# Patient Record
Sex: MALE | Race: WHITE
[De-identification: names, ages, dates, MRNs, and addresses within clinical notes are randomized per-mention and may not be internally consistent; named-entity substitution may affect disease eponyms.]

---

## 2017-05-04 ENCOUNTER — HOSPITAL ENCOUNTER (EMERGENCY)
Age: 57
Discharge: HOME | End: 2017-05-04
Payer: COMMERCIAL
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
## 2017-05-04 DIAGNOSIS — S22.32XA: Primary | ICD-10-CM

## 2017-05-06 ENCOUNTER — HOSPITAL ENCOUNTER (OUTPATIENT)
Age: 57
Discharge: HOME | End: 2017-05-06
Payer: COMMERCIAL

## 2017-05-06 ENCOUNTER — HOSPITAL ENCOUNTER (EMERGENCY)
Dept: HOSPITAL 76 - ED | Age: 57
Discharge: HOME | End: 2017-05-06
Payer: COMMERCIAL

## 2017-05-06 ENCOUNTER — HOSPITAL ENCOUNTER (OUTPATIENT)
Age: 57
Discharge: ANOTHER HOSPITAL | End: 2017-05-06
Payer: COMMERCIAL

## 2017-05-06 VITALS — DIASTOLIC BLOOD PRESSURE: 86 MMHG | SYSTOLIC BLOOD PRESSURE: 174 MMHG

## 2017-05-06 DIAGNOSIS — F41.9: Primary | ICD-10-CM

## 2017-05-06 DIAGNOSIS — F41.0: Primary | ICD-10-CM

## 2017-05-06 DIAGNOSIS — F10.239: Primary | ICD-10-CM

## 2017-05-06 DIAGNOSIS — F17.200: ICD-10-CM

## 2017-05-06 PROCEDURE — 99283 EMERGENCY DEPT VISIT LOW MDM: CPT

## 2017-05-06 NOTE — ED PHYSICIAN DOCUMENTATION
History of Present Illness





- Stated complaint


Stated Complaint: ANXIETY





- Chief complaint


Chief Complaint: MHE





- History obtained from


History obtained from: Patient, EMS





- History of Present Illness


Timing: Today





- Additonal information


Additional information: 





55 y/o male fell about one week ago and broke a rib. He was drinking at the 

time and he has since stopped. He is one week out from his last drink. He took 

some left over vicoden for the pain and he developed hallucinations that he 

attributed to the medication until it was pointed out that this could happen 

from alcohol withdrawal and then the patient had the realization. He finally 

began to eat this morning and had a good hug from his wife before she left for 

work and he was relieved and then he began to have a panic attack. He called 

the ambulance for anxiety. 





Review of Systems


Constitutional: denies: Fever, Chills


Eyes: denies: Decreased vision


Ears: reports: Loss of hearing.  denies: Ear pain


Nose: denies: Rhinorrhea / runny nose, Congestion


Throat: denies: Sore throat


Cardiac: reports: Chest pain / pressure


Respiratory: denies: Dyspnea, Cough


GI: denies: Vomiting


Musculoskeletal: denies: Neck pain, Back pain


Neurologic: reports: Other (shakes).  denies: Generalized weakness, Focal 

weakness, Numbness


Psychiatric: reports: Hallucinations





PD PAST MEDICAL HISTORY





- Present Medications


Home Medications: 


 Ambulatory Orders











 Medication  Instructions  Recorded  Confirmed


 


Hydrocodone/Acetaminophen [Vicodin 1 tab PO Q6HR 05/04/17 05/06/17





5-300 mg Tablet]   


 


Lorazepam [Ativan] 1 - 2 mg PO Q6HR PRN #20 tablet 05/06/17 














- Allergies


Allergies/Adverse Reactions: 


 Allergies











Allergy/AdvReac Type Severity Reaction Status Date / Time


 


cat dander Allergy  Itching Verified 05/04/17 19:29


 


Penicillins Allergy  Rash Verified 05/04/17 19:29














- Social History


Does the pt smoke?: Yes


Smoking Status: Current every day smoker


Does the pt drink ETOH?: Yes


ETOH Use: Beer


Does the pt have substance abuse?: No





PD ED PE NORMAL





- Vitals


Vital signs reviewed: Yes (tachy and hypertensive )





- General


General: Alert and oriented X 3, Well developed/nourished, Other (The patient 

does have the shakes. )





- HEENT


HEENT: Atraumatic, PERRL, EOMI





- Neck


Neck: Supple, no meningeal sign





- Cardiac


Cardiac: RRR, No murmur





- Respiratory


Respiratory: No respiratory distress, Clear bilaterally





- Abdomen


Abdomen: Soft, Non tender





- Back


Back: No CVA TTP, No spinal TTP





- Derm


Derm: Normal color, Warm and dry, No rash





- Extremities


Extremities: No deformity, No edema





- Neuro


Neuro: Alert and oriented X 3, No motor deficit, No sensory deficit, Normal 

speech





- Psych


Psych: Normal mood, Normal affect





Results





- Vitals


Vitals: 





 Vital Signs - 24 hr











  05/06/17





  10:15


 


Temperature 37.3 C


 


Heart Rate 123 H


 


Respiratory 22





Rate 


 


Blood Pressure 190/99 H


 


O2 Saturation 95








 Oxygen











O2 Source                      Room air

















PD MEDICAL DECISION MAKING





- ED course


Complexity details: considered differential, d/w patient


ED course: 





55 y/o male with acute alcohol withdrawals symptoms is offered IV fluids and 

refuses as he feels he will be able to hydrate orally. 





Departure





- Departure


Disposition: 01 Home, Self Care


Clinical Impression: 


Alcohol withdrawal


Qualifiers:


 Complication of substance-induced condition: with unspecified complication 

Qualified Code(s): F10.239 - Alcohol dependence with withdrawal, unspecified


Condition: Stable


Instructions:  ED Withdrawal Alcohol


Follow-Up: 


[facility] [place] [Provider Group]


Prescriptions: 


Lorazepam [Ativan] 1 - 2 mg PO Q6HR PRN #20 tablet


 PRN Reason: withdrawal symptoms

## 2017-05-08 ENCOUNTER — HOSPITAL ENCOUNTER (INPATIENT)
Dept: HOSPITAL 76 - ED | Age: 57
LOS: 11 days | Discharge: OTHER INSTITUTION | DRG: 897 | End: 2017-05-19
Attending: INTERNAL MEDICINE | Admitting: INTERNAL MEDICINE
Payer: COMMERCIAL

## 2017-05-08 ENCOUNTER — HOSPITAL ENCOUNTER (OUTPATIENT)
Dept: HOSPITAL 76 - EMS | Age: 57
Discharge: ANOTHER HOSPITAL | End: 2017-05-08
Attending: SURGERY
Payer: COMMERCIAL

## 2017-05-08 DIAGNOSIS — S22.39XD: ICD-10-CM
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
## 2017-05-08 DIAGNOSIS — E87.6: ICD-10-CM

## 2017-05-08 DIAGNOSIS — F17.210: ICD-10-CM

## 2017-05-08 DIAGNOSIS — T51.0X1A: ICD-10-CM

## 2017-05-08 DIAGNOSIS — R41.82: Primary | ICD-10-CM

## 2017-05-08 DIAGNOSIS — I10: ICD-10-CM

## 2017-05-08 DIAGNOSIS — F10.231: Primary | ICD-10-CM

## 2017-05-08 DIAGNOSIS — D53.9: ICD-10-CM

## 2017-05-08 DIAGNOSIS — Z78.1: ICD-10-CM

## 2017-05-08 LAB
ALBUMIN/GLOB SERPL: 1.4 {RATIO} (ref 1–2.2)
ANION GAP SERPL CALCULATED.4IONS-SCNC: 11 MMOL/L (ref 6–13)
BASOPHILS NFR BLD AUTO: 0.1 10^3/UL (ref 0–0.1)
BASOPHILS NFR BLD AUTO: 1.1 %
BILIRUB BLD-MCNC: 0.8 MG/DL (ref 0.2–1)
BUN SERPL-MCNC: 11 MG/DL (ref 6–20)
CALCIUM UR-MCNC: 9.3 MG/DL (ref 8.5–10.3)
CHLORIDE SERPL-SCNC: 101 MMOL/L (ref 101–111)
CO2 SERPL-SCNC: 24 MMOL/L (ref 21–32)
CREAT SERPLBLD-SCNC: 0.8 MG/DL (ref 0.6–1.2)
EOSINOPHIL # BLD AUTO: 0.1 10^3/UL (ref 0–0.7)
EOSINOPHIL NFR BLD AUTO: 1.7 %
ERYTHROCYTE [DISTWIDTH] IN BLOOD BY AUTOMATED COUNT: 14 % (ref 12–15)
GFRSERPLBLD MDRD-ARVRAT: 100 ML/MIN/{1.73_M2} (ref 89–?)
GLOBULIN SER-MCNC: 3.3 G/DL (ref 2.1–4.2)
GLUCOSE SERPL-MCNC: 104 MG/DL (ref 70–100)
HCT VFR BLD AUTO: 38.5 % (ref 42–52)
HGB UR QL STRIP: 13.7 G/DL (ref 14–18)
LIPASE SERPL-CCNC: 45 U/L (ref 22–51)
LYMPHOCYTES # SPEC AUTO: 2 10^3/UL (ref 1.5–3.5)
LYMPHOCYTES NFR BLD AUTO: 26 %
MAGNESIUM SERPL-MCNC: 1.9 MG/DL (ref 1.7–2.8)
MCH RBC QN AUTO: 34.3 PG (ref 27–31)
MCHC RBC AUTO-ENTMCNC: 35.5 G/DL (ref 32–36)
MCV RBC AUTO: 96.5 FL (ref 80–94)
MONOCYTES # BLD AUTO: 1.2 10^3/UL (ref 0–1)
MONOCYTES NFR BLD AUTO: 15.3 %
NEUTROPHILS # BLD AUTO: 4.2 10^3/UL (ref 1.5–6.6)
NEUTROPHILS # SNV AUTO: 7.6 X10^3/UL (ref 4.8–10.8)
NEUTROPHILS NFR BLD AUTO: 55.9 %
NRBC # BLD AUTO: 0 /100WBC
PDW BLD AUTO: 7.9 FL (ref 7.4–11.4)
POTASSIUM SERPL-SCNC: 3.3 MMOL/L (ref 3.5–5)
PROT SPEC-MCNC: 7.8 G/DL (ref 6.7–8.2)
RBC MAR: 3.99 10^6/UL (ref 4.7–6.1)
SODIUM SERPLBLD-SCNC: 136 MMOL/L (ref 135–145)
WBC # BLD: 7.6 X10^3/UL

## 2017-05-08 PROCEDURE — 96375 TX/PRO/DX INJ NEW DRUG ADDON: CPT

## 2017-05-08 PROCEDURE — 85610 PROTHROMBIN TIME: CPT

## 2017-05-08 PROCEDURE — 96376 TX/PRO/DX INJ SAME DRUG ADON: CPT

## 2017-05-08 PROCEDURE — 81003 URINALYSIS AUTO W/O SCOPE: CPT

## 2017-05-08 PROCEDURE — 99285 EMERGENCY DEPT VISIT HI MDM: CPT

## 2017-05-08 PROCEDURE — 83690 ASSAY OF LIPASE: CPT

## 2017-05-08 PROCEDURE — 96365 THER/PROPH/DIAG IV INF INIT: CPT

## 2017-05-08 PROCEDURE — 70450 CT HEAD/BRAIN W/O DYE: CPT

## 2017-05-08 PROCEDURE — 36415 COLL VENOUS BLD VENIPUNCTURE: CPT

## 2017-05-08 PROCEDURE — 87086 URINE CULTURE/COLONY COUNT: CPT

## 2017-05-08 PROCEDURE — 83735 ASSAY OF MAGNESIUM: CPT

## 2017-05-08 PROCEDURE — 96361 HYDRATE IV INFUSION ADD-ON: CPT

## 2017-05-08 PROCEDURE — 85025 COMPLETE CBC W/AUTO DIFF WBC: CPT

## 2017-05-08 PROCEDURE — 84100 ASSAY OF PHOSPHORUS: CPT

## 2017-05-08 PROCEDURE — 80320 DRUG SCREEN QUANTALCOHOLS: CPT

## 2017-05-08 PROCEDURE — 80053 COMPREHEN METABOLIC PANEL: CPT

## 2017-05-08 PROCEDURE — 87150 DNA/RNA AMPLIFIED PROBE: CPT

## 2017-05-08 PROCEDURE — 81001 URINALYSIS AUTO W/SCOPE: CPT

## 2017-05-08 PROCEDURE — 80306 DRUG TEST PRSMV INSTRMNT: CPT

## 2017-05-08 RX ADMIN — LORAZEPAM PRN MG: 2 INJECTION, SOLUTION INTRAMUSCULAR; INTRAVENOUS at 19:56

## 2017-05-08 RX ADMIN — LORAZEPAM PRN MG: 2 INJECTION, SOLUTION INTRAMUSCULAR; INTRAVENOUS at 18:52

## 2017-05-08 RX ADMIN — HEPARIN SODIUM SCH UNIT: 5000 INJECTION, SOLUTION INTRAVENOUS; SUBCUTANEOUS at 20:32

## 2017-05-08 RX ADMIN — SODIUM CHLORIDE, PRESERVATIVE FREE SCH ML: 5 INJECTION INTRAVENOUS at 22:10

## 2017-05-08 RX ADMIN — LORAZEPAM PRN MG: 2 INJECTION, SOLUTION INTRAMUSCULAR; INTRAVENOUS at 20:50

## 2017-05-08 RX ADMIN — LORAZEPAM PRN MG: 2 INJECTION, SOLUTION INTRAMUSCULAR; INTRAVENOUS at 20:30

## 2017-05-08 RX ADMIN — POTASSIUM CHLORIDE, DEXTROSE MONOHYDRATE AND SODIUM CHLORIDE SCH MLS/HR: 150; 5; 450 INJECTION, SOLUTION INTRAVENOUS at 19:51

## 2017-05-08 NOTE — HISTORY & PHYSICAL EXAMINATION
Chief Complaint





- Chief Complaint


Chief Complaint: tremor and hallucinations





History of Present Illness





- Admitted From


Admitted From:: [facility] room





- History Obtained From


Records Reviewed: minimal ER records are available


History obtained from: wife and stepson,, with minimal history from patient


Exam Limitations: active alcohol withdrawal





- History of Present Illness


HPI Comment/Other: 





this 56-year-old man has been using both alcohol and tobacco since his 20s, 

according to his wife.  She reports that he drinks up to acase of beer per 

day.. He has stopped in the past for up to a week. Recently,, she asked him  to 

cut down on his drinking, as they have  company  coming soon.sshe believes  he 

stopped drinking  completely  8 days ago..aabout 4 days ago, he fell  and 

struck his rib cage on a desk, and fractured the left seventh rib..  He tried 

to take some old Vicodin, but had a bad reaction. She says he then tried over-

the-counter ibuprofen and an over-the-counter sleepy. He was quite confused the 

next day, so they stopped  his medications.


Since then, he has become  progressively  more confused. He seems to be 

hallucinating about people  on a different plane who are trying to kill him,, 

and also  about dangerous people and animals in their yard. 


ER evaluation  is suggestive of active  alcohol withdrawal. because he remained 

agitated and delusional in the emergency room, he could not be transferred to a 

psychiatric facility  for rehabilitation.


He is now admitted for management  of active DTs, and is admitted  to the ICU 

for close monitoring. He is very confused and is actively trying to pull out 

lines  and climb out of bed.  He represents a danger to himself, and perhaps 

others.





the patient cannot really give an accurate history. his wife says he has not  

complained fever or chills, headaches or dizziness, and no eye or ear  symptoms

, sore throat.. He does have a chronic cough  which is unchanged..  He is 

having some pain with deep breathing  since he fell and broke his rib. He 

otherwise denies chest pain or palpitations. He has not reported abdominal pain,

, nausea or vomiting, diarrhea or constipation.  He does not report dysuria. 

She does note that he is ordinarily quite sedentary.





he does not have a primary care pphysician,,  and has probably not seen a 

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





Past medical history:


Previous hypertension


Alcohol abuse


Tobacco abuse





current medications: None. He did recently take ibuprofen, and over-the-counter 

sleep meds, and an old Vicodin, several days ago.





Allergies: Penicillin, cats. Next





Social history: The patient is retired from the [employer]  He lives with his wife. 

He has a stepson in [place]. He drinks approximately one case of , and smokes 

about 2 packs of cigarettes per day, since the age of 14.  He does not use 

drugs.





Family history: His mother has COPD. His sister  of leukemia. Other family 

history is unknown.





History





- Past Medical History


Cardiovascular: reports: Hypertension


Respiratory: reports: None


Neuro: reports: None


Endocrine/Autoimmune: reports: None


GI: reports: None


: reports: None


HEENT: reports: Chronic vision loss, Chronic hearing loss


Psych: reports: None


Musculoskeletal: reports: None


Derm: reports: None


MRSA Hx?: No





Meds/Allgy





- Allergies


Allergies/Adverse Reactions: 


 Allergies











Allergy/AdvReac Type Severity Reaction Status Date / Time


 


cat dander Allergy  Itching Verified 17 08:52


 


Penicillins Allergy  Rash Verified 17 08:52














Exam





- Vital Signs


Reviewed Vital Signs: Yes


Vital Signs: 





 Vital Signs x48h











  Temp Pulse Resp BP Pulse Ox


 


 17 18:28   108 H  20  151/118 H  96


 


 17 18:17  37.2 C  106 H  26 H  142/88 H  96








on exam,  he appears older than his stated age.. He is seen in the ICU, and has 

wrist restraints on. He is very fidgety. He repeatedly asks for a knife  to cut 

his restraints.


Head: Normocephalic, atraumatic.


Ears: TMs are clear. Moderate cerumen is noted in the right ear canal.


Eyes: PERRLA, EOMI, anicteric. Normal conjunctiva.


Pharynx: Is clear. Teeth are in fair condition.


Neck: Appears supple, without lymphadenopathy,, JVD, thyromegaly, bruits.


cardiac exam:chest regular rate and rhythm, with normal S1 and S2,, without 

murmurs,, rubs, gallops.


Lungs: Clear to auscultation, without rales, rhonchi, wheezes


Abdomen: Soft and nontender, without obvious masses. No regarding  or rebound.. 

Active bowel sounds.


Extremities: Cyanosis,, clubbing, edema. Pulses intact.


Neurologic: The patient is awake and alert,, but clearly confused.  He could 

not stay the day, month,, year, or president . he did recall his wife's name 

and how long they have been . he is trying to get out  of restraints, 

but does not appear  violent  or especially agitated.


Cranial nerves  are grossly intact.


Motor strength is intact and symmetric throughout.


Cerebellar: Patient is quite tremulous. Finger-to-finger exam could not be 

performed due to restraints.





Conclusion/Plan





- Lab Results


Fish Bones: 


 17 09:55





 17 09:55


Other Lab Results: 





urine tox screen: Is negative. Alcohol level is less than 5.0next





Head CT: Shows normal brain parenchyma. He does have left frontal and left 

ethmoid and maxillary chronic sinusitis





Issues/Core Measures





- Anticipated LOS


Anticipated Stay Length: 2 or more midnights





- Issues


Hospital Issues and Management Plan: 





#1.delirium tremens,, related to alcohol withdrawal.





-Patient is actively hallucinating.  He is hypertensive, and is certainly at 

risk for harming  due to his confusion. He is at risk for seizures. He has been 

admitted to the ICU for close observation.


Monitor ciwa scale,, when necessary Ativan.


Telemetry.


Soft wrist restraints,, regarding keeping lines in place and keeping him from 

falling out of bed.


Social work consult. Consideration of inpatient rehabilitation. 


It was discussed with the patient's family  that he may need further 

psychiatric evaluation, if his symptoms  do not improve.


-IV fluids, banana bag with vitamins, followup labs..





#2. CODE STATUS: full code.





#3. DVT prophylaxis: Subcutaneous heparin.





#4. History of hypertension. Untreated.


-Monitor. Consider treatment after  withdrawal is controlled. Add clonidine  if 

needed.





#5. Tobacco abuse.


-Nicoderm.


Tobacco cessation counseling.





#6. Left seventh rib fracture. Pain meds when necessary. cchest  wrap  when 

necessary.





#7. Hypokalemia. Replace.





-Monitor magnesium also.


#8. Anemia, macrocytic.  Likely due to the effects of alcohol  on his bone 

marrow.


-Nutrition consult.





this visit to took approximately 65 minutes,, to review records,, test results, 

interview  the patient and his family,, examine him,, write orders

## 2017-05-08 NOTE — ED PHYSICIAN DOCUMENTATION
PD HPI MHE





- Stated complaint


Stated Complaint: MHE





- Chief complaint


Chief Complaint: MHE





- History obtained from


History obtained from: Patient, Family (spouse)





- History of Present Illness


Primary symptom: Psychosis, Anxiety, Other (shaky and nauseated)


Timing - onset: How many days ago (3-4)


Contributing factors: Substance abuse - ETOH (had been drinking heavily/

regularly up to 8 days ago, per patient. Was having mild shakiness and nausea 

for a few days, then having increased anxiety, poor sleep, nausea (but still 

able to hydrate), and paranoia, shakiness the past 3-4 days worsening. Seen in 

ED 2 days ago with Rx for Ativan, but did not fill it. Was feeling better with 

Ativan in the ED. Continues with agitation, shakiness, and worsening paranoia 

about having detectors placed in his head, and small creatures putting things 

in his toes.)


Similar symptoms before: Has not had sx before (has had sober periods in the 

past with mild withdrawal but no prior withdrawal psychosis, seizures, nor 

hospitalization.)


Recently seen: Emergency Dept (2 days ago for similar but milder.)





Review of Systems


Constitutional: denies: Fever, Chills


Nose: denies: Rhinorrhea / runny nose, Congestion


Throat: denies: Sore throat


Cardiac: denies: Chest pain / pressure, Palpitations


Respiratory: denies: Dyspnea, Cough


GI: reports: Nausea.  denies: Abdominal Pain, Vomiting, Diarrhea, Bloody / 

black stool


: denies: Dysuria, Frequency


Skin: denies: Rash, Lesions


Musculoskeletal: denies: Extremity swelling


Neurologic: reports: Generalized weakness, Confused.  denies: Focal weakness, 

Numbness, Seizure, Headache, Head injury


Endocrine: denies: Weight loss


Immunocompromised: denies: Immunocompromised





PD PAST MEDICAL HISTORY





- Past Medical History


Cardiovascular: None


Respiratory: None


Neuro: None


Endocrine/Autoimmune: None


Psych: None





- Allergies


Allergies/Adverse Reactions: 


 Allergies











Allergy/AdvReac Type Severity Reaction Status Date / Time


 


cat dander Allergy  Itching Verified 05/08/17 08:52


 


Penicillins Allergy  Rash Verified 05/08/17 08:52














- Social History


Does the pt smoke?: Yes


Smoking Status: Current every day smoker


Does the pt drink ETOH?: Yes


Does the pt have substance abuse?: No





PD ED PE NORMAL





- Vitals


Vital signs reviewed: Yes





- General


General: Alert and oriented X 3, Well developed/nourished





- HEENT


HEENT: Atraumatic, PERRL, EOMI, Pharynx benign.  No: Moist mucous membranes





- Neck


Neck: Supple, no meningeal sign, No adenopathy





- Cardiac


Cardiac: No: RRR (mild tachycardic; also hypertensive)





- Respiratory


Respiratory: Clear bilaterally





- Abdomen


Abdomen: Soft, Non tender, No organomegaly





- Male 


Male : Deferred





- Rectal


Rectal: Deferred





- Back


Back: No CVA TTP





- Derm


Derm: Normal color, Warm and dry





- Extremities


Extremities: No edema, No calf tenderness / cord





- Neuro


Neuro: Alert and oriented X 3, No motor deficit, Normal speech





- Psych


Psych: No: Normal mood (anxious, very shaky in extremities with worsening on 

intention. He has fixed delusions of having "detectors placed in his head" and 

that creatures or such had "put things in my toes and burned them". I asked 

about listening to his chest and he asked me if I heard any machines or unusual 

sounds in the chest and he thinks there were things put in his chest. He denies 

hearing voices nor visual hallucinations per se. However he is asking for MRI 

to find the detectors in his head and if I can remove the things from his 

chest. Increasing shakiness while getting initial labs, despite IV Ativan. )





Results





- Vitals


Vitals: 


 Vital Signs - 24 hr











  05/08/17 05/08/17 05/08/17





  08:48 11:19 13:36


 


Temperature 37.4 C 36.6 C 


 


Heart Rate 94 80 


 


Respiratory 18 18 





Rate   


 


Blood Pressure 187/88 H 137/76 H 


 


O2 Saturation 100 96 98








 Oxygen











O2 Source                      Room air

















- Labs


Labs: 


 Laboratory Tests











  05/08/17 05/08/17 05/08/17





  09:55 09:55 11:30


 


WBC  7.6  


 


RBC  3.99 L  


 


Hgb  13.7 L  


 


Hct  38.5 L  


 


MCV  96.5 H  


 


MCH  34.3 H  


 


MCHC  35.5  


 


RDW  14.0  


 


Plt Count  208  


 


MPV  7.9  


 


Neut #  4.2  


 


Lymph #  2.0  


 


Mono #  1.2 H  


 


Eos #  0.1  


 


Baso #  0.1  


 


Absolute Nucleated RBC  0.00  


 


Nucleated RBCs  0.0  


 


Sodium   136 


 


Potassium   3.3 L 


 


Chloride   101 


 


Carbon Dioxide   24 


 


Anion Gap   11.0 


 


BUN   11 


 


Creatinine   0.8 


 


Estimated GFR (MDRD)   100 


 


Glucose   104 H 


 


Calcium   9.3 


 


Magnesium   1.9 


 


Total Bilirubin   0.8 


 


AST   36 


 


ALT   32 


 


Alkaline Phosphatase   62 


 


Total Protein   7.8 


 


Albumin   4.5 


 


Globulin   3.3 


 


Albumin/Globulin Ratio   1.4 


 


Lipase   45 


 


Urine Opiates Screen    NEGATIVE


 


Ur Oxycodone Screen    NEGATIVE


 


Urine Methadone Screen    NEGATIVE


 


Ur Propoxyphene Screen    NEGATIVE


 


Ur Barbiturates Screen    NEGATIVE


 


Ur Tricyclics Screen    NEGATIVE


 


Ur Phencyclidine Scrn    NEGATIVE


 


Ur Amphetamine Screen    NEGATIVE


 


U Methamphetamines Scrn    NEGATIVE


 


U Benzodiazepines Scrn    NEGATIVE


 


Urine Cocaine Screen    NEGATIVE


 


U Cannabinoids Screen    NEGATIVE


 


Ethyl Alcohol   < 5.0 














- Rads (name of study)


  ** head CT


Radiology: Prelim report reviewed (no acute process)





PD MEDICAL DECISION MAKING





- ED course


Complexity details: re-evaluated patient (He has withdrawal with shakiness, 

nausea, hypertension, and some confusion, and also clear delusions. This seems 

to be complicated withdrawal. Not much improved with IV fluids and Ativan. 

Given dose Zyprexa as well. Continues with shakiness and delusions, and took 

few more doses of Ativan for improvement, though mainly it was sedation and not 

really clearing of the delusions. Much less shaky for now. However, I do not 

see his being treatable with PO medications at this time. Will have SW look at 

detox facility, though may be too sick for that and would require 

hospitalization for initial treatment. ), considered differential, d/w patient





Departure





- Departure


Clinical Impression: 


 Alcohol withdrawal delirium, acute, mixed level of activity, Paranoia


Condition: Stable


Record reviewed to determine appropriate education?: Yes

## 2017-05-08 NOTE — CT PRELIMINARY REPORT
Accession: [id number]

Exam: CT Head W/O

 

IMPRESSION: 

1. Brain parenchyma within normal limits. No mass or hemorrhage.

2. Left frontal and to a lesser degree left ethmoid and maxillary chronic sinusitis.

 

[facility]

 

SITE ID: [id number]

## 2017-05-08 NOTE — CT REPORT
EXAM:

CT HEAD

 

EXAM DATE: 5/8/2017 01:10 PM.

 

CLINICAL HISTORY: Confusion and paranoia.

 

COMPARISON: None.

 

TECHNIQUE: Multiaxial CT images were obtained from the foramen magnum to the vertex. IV contrast: Non
e. Reformats: Coronal.

 

In accordance with CT protocol optimization, one or more of the following dose reduction techniques w
ere utilized for this exam: automated exposure control, adjustment of mA and/or KV based on patient s
ize, or use of iterative reconstructive technique.

 

FINDINGS:

Parenchyma: No intraparenchymal hemorrhage. No evidence of mass, midline shift, or CT findings of inf
arction. Gray-white differentiation is distinct.

 

Extraaxial Spaces: Normal for age. No subdural or epidural collections identified.

 

Ventricles: Normal in size and position.

 

Sinuses: The left frontal sinus is nearly completely opacified. Some mucosal thickening also present 
in the left ethmoid air cells. Mild mucosal thickening about the inferior left maxillary sinus also p
resent.

 

Bones: No evidence of fracture or calvarial defect.

 

Other: None.

 

IMPRESSION: 

1. Brain parenchyma within normal limits. No mass or hemorrhage.

2. Left frontal and to a lesser degree left ethmoid and maxillary chronic sinusitis.

 

[facility]

Referring Provider Line: [phone]

 

SITE ID: [id number]

## 2017-05-09 LAB
ALBUMIN/GLOB SERPL: 1.2 {RATIO} (ref 1–2.2)
ANION GAP SERPL CALCULATED.4IONS-SCNC: 8 MMOL/L (ref 6–13)
BASOPHILS NFR BLD AUTO: 0 10^3/UL (ref 0–0.1)
BASOPHILS NFR BLD AUTO: 0.8 %
BILIRUB BLD-MCNC: 0.8 MG/DL (ref 0.2–1)
BUN SERPL-MCNC: 7 MG/DL (ref 6–20)
CALCIUM UR-MCNC: 8.4 MG/DL (ref 8.5–10.3)
CHLORIDE SERPL-SCNC: 105 MMOL/L (ref 101–111)
CO2 SERPL-SCNC: 24 MMOL/L (ref 21–32)
CREAT SERPLBLD-SCNC: 0.6 MG/DL (ref 0.6–1.2)
EOSINOPHIL # BLD AUTO: 0.2 10^3/UL (ref 0–0.7)
EOSINOPHIL NFR BLD AUTO: 3.8 %
ERYTHROCYTE [DISTWIDTH] IN BLOOD BY AUTOMATED COUNT: 14.2 % (ref 12–15)
GFRSERPLBLD MDRD-ARVRAT: 140 ML/MIN/{1.73_M2} (ref 89–?)
GLOBULIN SER-MCNC: 2.8 G/DL (ref 2.1–4.2)
GLUCOSE SERPL-MCNC: 91 MG/DL (ref 70–100)
HCT VFR BLD AUTO: 38.3 % (ref 42–52)
HGB UR QL STRIP: 13 G/DL (ref 14–18)
INR PPP: 1.1 (ref 0.8–1.2)
LYMPHOCYTES # SPEC AUTO: 2 10^3/UL (ref 1.5–3.5)
LYMPHOCYTES NFR BLD AUTO: 35.4 %
MAGNESIUM SERPL-MCNC: 2.2 MG/DL (ref 1.7–2.8)
MCH RBC QN AUTO: 33.4 PG (ref 27–31)
MCHC RBC AUTO-ENTMCNC: 33.9 G/DL (ref 32–36)
MCV RBC AUTO: 98.7 FL (ref 80–94)
MONOCYTES # BLD AUTO: 0.8 10^3/UL (ref 0–1)
MONOCYTES NFR BLD AUTO: 13.6 %
NEUTROPHILS # BLD AUTO: 2.6 10^3/UL (ref 1.5–6.6)
NEUTROPHILS # SNV AUTO: 5.7 X10^3/UL (ref 4.8–10.8)
NEUTROPHILS NFR BLD AUTO: 46.4 %
NRBC # BLD AUTO: 0 /100WBC
PDW BLD AUTO: 7.9 FL (ref 7.4–11.4)
PHOSPHATE BLD-MCNC: 3.3 MG/DL (ref 2.5–4.6)
POTASSIUM SERPL-SCNC: 3.6 MMOL/L (ref 3.5–5)
PROT SPEC-MCNC: 6.2 G/DL (ref 6.7–8.2)
PROTHROM ACT/NOR PPP: 12.7 SECS (ref 9.9–12.6)
RBC MAR: 3.88 10^6/UL (ref 4.7–6.1)
SODIUM SERPLBLD-SCNC: 137 MMOL/L (ref 135–145)
WBC # BLD: 5.7 X10^3/UL

## 2017-05-09 RX ADMIN — LORAZEPAM PRN MG: 2 INJECTION, SOLUTION INTRAMUSCULAR; INTRAVENOUS at 13:00

## 2017-05-09 RX ADMIN — LORAZEPAM PRN MG: 2 INJECTION, SOLUTION INTRAMUSCULAR; INTRAVENOUS at 03:20

## 2017-05-09 RX ADMIN — SODIUM CHLORIDE SCH MLS/HR: 9 INJECTION, SOLUTION INTRAVENOUS at 10:15

## 2017-05-09 RX ADMIN — LORAZEPAM PRN MG: 2 INJECTION, SOLUTION INTRAMUSCULAR; INTRAVENOUS at 03:51

## 2017-05-09 RX ADMIN — HEPARIN SODIUM SCH UNIT: 5000 INJECTION, SOLUTION INTRAVENOUS; SUBCUTANEOUS at 09:51

## 2017-05-09 RX ADMIN — LORAZEPAM PRN MG: 2 INJECTION, SOLUTION INTRAMUSCULAR; INTRAVENOUS at 05:46

## 2017-05-09 RX ADMIN — LORAZEPAM PRN MG: 2 INJECTION, SOLUTION INTRAMUSCULAR; INTRAVENOUS at 13:27

## 2017-05-09 RX ADMIN — LORAZEPAM PRN MG: 2 INJECTION, SOLUTION INTRAMUSCULAR; INTRAVENOUS at 05:06

## 2017-05-09 RX ADMIN — LORAZEPAM PRN MG: 2 INJECTION, SOLUTION INTRAMUSCULAR; INTRAVENOUS at 11:33

## 2017-05-09 RX ADMIN — SODIUM CHLORIDE, PRESERVATIVE FREE SCH ML: 5 INJECTION INTRAVENOUS at 13:32

## 2017-05-09 RX ADMIN — LORAZEPAM PRN MG: 2 INJECTION, SOLUTION INTRAMUSCULAR; INTRAVENOUS at 17:53

## 2017-05-09 RX ADMIN — LORAZEPAM PRN MG: 2 INJECTION, SOLUTION INTRAMUSCULAR; INTRAVENOUS at 14:25

## 2017-05-09 RX ADMIN — LORAZEPAM PRN MG: 2 INJECTION, SOLUTION INTRAMUSCULAR; INTRAVENOUS at 13:52

## 2017-05-09 RX ADMIN — LORAZEPAM PRN MG: 2 INJECTION, SOLUTION INTRAMUSCULAR; INTRAVENOUS at 10:01

## 2017-05-09 RX ADMIN — NICOTINE SCH PATCH: 14 PATCH TRANSDERMAL at 09:19

## 2017-05-09 RX ADMIN — LORAZEPAM PRN MG: 2 INJECTION, SOLUTION INTRAMUSCULAR; INTRAVENOUS at 12:24

## 2017-05-09 RX ADMIN — LORAZEPAM PRN MG: 2 INJECTION, SOLUTION INTRAMUSCULAR; INTRAVENOUS at 17:31

## 2017-05-09 RX ADMIN — SODIUM CHLORIDE, PRESERVATIVE FREE SCH ML: 5 INJECTION INTRAVENOUS at 22:08

## 2017-05-09 RX ADMIN — SODIUM CHLORIDE SCH MG: 9 INJECTION, SOLUTION INTRAVENOUS at 06:18

## 2017-05-09 RX ADMIN — Medication SCH: at 10:16

## 2017-05-09 RX ADMIN — LORAZEPAM PRN MG: 2 INJECTION, SOLUTION INTRAMUSCULAR; INTRAVENOUS at 15:16

## 2017-05-09 RX ADMIN — SODIUM CHLORIDE, PRESERVATIVE FREE SCH ML: 5 INJECTION INTRAVENOUS at 06:18

## 2017-05-09 RX ADMIN — LORAZEPAM PRN MG: 2 INJECTION, SOLUTION INTRAMUSCULAR; INTRAVENOUS at 10:58

## 2017-05-09 RX ADMIN — LORAZEPAM PRN MG: 2 INJECTION, SOLUTION INTRAMUSCULAR; INTRAVENOUS at 10:24

## 2017-05-09 RX ADMIN — POTASSIUM CHLORIDE, DEXTROSE MONOHYDRATE AND SODIUM CHLORIDE SCH MLS/HR: 150; 5; 450 INJECTION, SOLUTION INTRAVENOUS at 05:46

## 2017-05-09 RX ADMIN — POTASSIUM CHLORIDE, DEXTROSE MONOHYDRATE AND SODIUM CHLORIDE SCH MLS/HR: 150; 5; 450 INJECTION, SOLUTION INTRAVENOUS at 20:36

## 2017-05-09 RX ADMIN — LORAZEPAM PRN MG: 2 INJECTION, SOLUTION INTRAMUSCULAR; INTRAVENOUS at 09:25

## 2017-05-09 RX ADMIN — HEPARIN SODIUM SCH UNIT: 5000 INJECTION, SOLUTION INTRAVENOUS; SUBCUTANEOUS at 20:36

## 2017-05-09 RX ADMIN — Medication SCH MLS/HR: at 22:53

## 2017-05-09 RX ADMIN — Medication SCH MLS/HR: at 15:29

## 2017-05-09 NOTE — PROVIDER PROGRESS NOTE
Assessment/Plan





- Problem List


(1) Alcohol withdrawal delirium, acute, mixed level of activity


Assessment/Plan: 


Jsoe R is still very confused and very tremulous.


He is afebrile and not tachycardic


He needs continued close monitoring and meds.


Will see how he is doing and decide if improved enough to go to MED Surg








- Current Meds


Current Meds: 





 Current Medications











Generic Name Dose Route Start Last Admin





  Trade Name Freq  PRN Reason Stop Dose Admin


 


Heparin Sodium (Porcine)  5,000 unit 05/08/17 21:00 05/09/17 09:51





    SUBQ   5,000 unit





  BID DEBORAH   Administration


 


Potassium Chloride/Dextrose/Sod Cl  1,000 mls @ 100 mls/hr 05/08/17 18:00 05/09/ 17 05:46





  D5.45ns W/20 Meq Kcl  IV   100 mls/hr





  .Q10H DEBORAH   Administration


 


Multivitamins 10 ml/ Thiamine  1,011.2 mls @ 100 mls/hr 05/09/17 09:00 05/09/17 

10:15





HCl 100 mg/ Folic Acid 1 mg/  IV   100 mls/hr





Sodium Chloride  DAILY DEBORAH   Administration


 


Lorazepam  2 mg 05/08/17 17:36 05/09/17 11:33





  Ativan Inj  IVP   2 mg





  Q30M PRN   Administration





  CIWA>8   





  Protocol   


 


Nicotine  1 patch 05/09/17 09:00 05/09/17 09:19





  Nicoderm  TOP   1 patch





  DAILY DEBORAH   Administration


 


Pantoprazole Sodium  40 mg 05/09/17 07:00 05/09/17 06:18





  Protonix  IVP   40 mg





  QDAC DEBORAH   Administration


 


Prenatal Multivit/Folic Acid/Iron  1 tab 05/09/17 08:00 05/09/17 10:16





  Trinatal Rx 1  PO   Not Given





  DAILYWM DEBORAH   


 


Sodium Chloride  10 ml 05/08/17 22:00 05/09/17 06:18





  Normal Saline Flush 0.9%  IVP   10 ml





  Q8HR DEBORAH   Administration


 


Thiamine HCl  100 mg 05/09/17 09:00 05/09/17 10:16





  Vitamin B-1  PO   Not Given





  DAILY DEBORAH   














- Lab Result


Fish Bone Diagrams: 


 05/09/17 04:45





 05/09/17 04:45





Subjective





- Subjective


Patient Reports: Other (He appears anxious and sullen not answering simple 

questions and appears to be glaring.)





Objective


Vital Signs: 





 Vital Signs - 24 hr











  05/08/17 05/08/17 05/08/17





  18:17 18:28 19:00


 


Temperature 37.2 C  


 


Heart Rate   


 


Heart Rate [ 106 H 108 H 99





Monitoring   





electrodes]   


 


Respiratory 26 H 20 21





Rate   


 


Blood Pressure 142/88 H 151/118 H 122/103 H





[Right Brachial   





artery]   


 


O2 Saturation 96 96 99














  05/08/17 05/08/17 05/08/17





  19:43 21:00 22:00


 


Temperature 36.5 C  


 


Heart Rate   


 


Heart Rate [ 112 H 89 64





Monitoring   





electrodes]   


 


Respiratory 19 15 18





Rate   


 


Blood Pressure 143/57 H 134/70 H 115/56 L





[Right Brachial   





artery]   


 


O2 Saturation 96  99














  05/08/17 05/09/17 05/09/17





  23:00 00:00 01:00


 


Temperature  36.8 C 


 


Heart Rate   


 


Heart Rate [ 75 70 67





Monitoring   





electrodes]   


 


Respiratory 17 13 14





Rate   


 


Blood Pressure 148/64 H 130/74 117/73





[Right Brachial   





artery]   


 


O2 Saturation 95 94 93














  05/09/17 05/09/17 05/09/17





  02:00 03:00 04:00


 


Temperature   36.9 C


 


Heart Rate   


 


Heart Rate [ 65 64 69





Monitoring   





electrodes]   


 


Respiratory 14 15 16





Rate   


 


Blood Pressure 129/75 114/69 124/65





[Right Brachial   





artery]   


 


O2 Saturation 93 99 93














  05/09/17 05/09/17 05/09/17





  05:00 05:50 06:57


 


Temperature   


 


Heart Rate   


 


Heart Rate [ 117 H 78 73





Monitoring   





electrodes]   


 


Respiratory 23 16 14





Rate   


 


Blood Pressure 143/90 H 157/79 H 142/68 H





[Right Brachial   





artery]   


 


O2 Saturation  93 96














  05/09/17 05/09/17 05/09/17





  07:49 08:46 09:35


 


Temperature 36.4 C L  


 


Heart Rate   


 


Heart Rate [ 63 61 102 H





Monitoring   





electrodes]   


 


Respiratory 13 20 21





Rate   


 


Blood Pressure 142/77 H 124/86 H 124/96 H





[Right Brachial   





artery]   


 


O2 Saturation 92 98 97














  05/09/17 05/09/17





  10:45 11:00


 


Temperature  36.4 C L


 


Heart Rate 85 


 


Heart Rate [  73





Monitoring  





electrodes]  


 


Respiratory 21 16





Rate  


 


Blood Pressure  163/83 H





[Right Brachial  





artery]  


 


O2 Saturation  95








 Oxygen











O2 Source                      Room air














I&O (Last 24 Hrs): 





 Intake and Output Totals x24h











 05/07/17 05/08/17 05/09/17





 23:59 23:59 23:59


 


Intake Total  473 1490


 


Output Total  0 450


 


Balance  473 1040











General: Alert


HEENT: PERRLA, EOMI


Neck: No JVD, No thyromegaly


Neuro: Alert, Disoriented, Non Focal


Cardiovascular: Regular rate, No murmurs


Respiratory: No respiratory distress, Breath sounds nml


Abdomen: Normal bowel sounds, Soft


Extremities: No cyanosis, No edema


Skin: No rashes, No breakdown





- Results


Results: 





 Laboratory Results











WBC  5.7 x10^3/uL (4.8-10.8)   05/09/17  04:45    


 


RBC  3.88 10^6/uL (4.70-6.10)  L  05/09/17  04:45    


 


Hgb  13.0 g/dL (14.0-18.0)  L  05/09/17  04:45    


 


Hct  38.3 % (42.0-52.0)  L  05/09/17  04:45    


 


MCV  98.7 fL (80.0-94.0)  H  05/09/17  04:45    


 


MCH  33.4 pg (27.0-31.0)  H  05/09/17  04:45    


 


MCHC  33.9 g/dL (32.0-36.0)   05/09/17  04:45    


 


RDW  14.2 % (12.0-15.0)   05/09/17  04:45    


 


Plt Count  212 10^3/uL (130-450)   05/09/17  04:45    


 


MPV  7.9 fL (7.4-11.4)   05/09/17  04:45    


 


Neut #  2.6 10^3/uL (1.5-6.6)   05/09/17  04:45    


 


Lymph #  2.0 10^3/uL (1.5-3.5)   05/09/17  04:45    


 


Mono #  0.8 10^3/uL (0.0-1.0)   05/09/17  04:45    


 


Eos #  0.2 10^3/uL (0.0-0.7)   05/09/17  04:45    


 


Baso #  0.0 10^3/uL (0.0-0.1)   05/09/17  04:45    


 


Absolute Nucleated RBC  0.00 x10^3/uL  05/09/17  04:45    


 


Nucleated RBCs  0.0 /100WBC  05/09/17  04:45    


 


PT  12.7 secs (9.9-12.6)  H  05/09/17  04:45    


 


INR  1.1  (0.8-1.2)   05/09/17  04:45    


 


Sodium  137 mmol/L (135-145)   05/09/17  04:45    


 


Potassium  3.6 mmol/L (3.5-5.0)   05/09/17  04:45    


 


Chloride  105 mmol/L (101-111)   05/09/17  04:45    


 


Carbon Dioxide  24 mmol/L (21-32)   05/09/17  04:45    


 


Anion Gap  8.0  (6-13)   05/09/17  04:45    


 


BUN  7 mg/dL (6-20)   05/09/17  04:45    


 


Creatinine  0.6 mg/dL (0.6-1.2)   05/09/17  04:45    


 


Estimated GFR (MDRD)  140  (>89)   05/09/17  04:45    


 


Glucose  91 mg/dL ()   05/09/17  04:45    


 


Calcium  8.4 mg/dL (8.5-10.3)  L  05/09/17  04:45    


 


Phosphorus  3.3 mg/dL (2.5-4.6)   05/09/17  04:45    


 


Magnesium  2.2 mg/dL (1.7-2.8)   05/09/17  04:45    


 


Total Bilirubin  0.8 mg/dL (0.2-1.0)   05/09/17  04:45    


 


AST  28 IU/L (10-42)   05/09/17  04:45    


 


ALT  24 IU/L (10-60)   05/09/17  04:45    


 


Alkaline Phosphatase  47 IU/L ()   05/09/17  04:45    


 


Total Protein  6.2 g/dL (6.7-8.2)  L  05/09/17  04:45    


 


Albumin  3.4 g/dL (3.2-5.5)   05/09/17  04:45    


 


Globulin  2.8 g/dL (2.1-4.2)   05/09/17  04:45    


 


Albumin/Globulin Ratio  1.2  (1.0-2.2)   05/09/17  04:45    


 


Lipase  45 U/L (22-51)   05/08/17  09:55    


 


Urine Opiates Screen  NEGATIVE  (NEGATIVE)   05/08/17  11:30    


 


Ur Oxycodone Screen  NEGATIVE  (NEGATIVE)   05/08/17  11:30    


 


Urine Methadone Screen  NEGATIVE  (NEGATIVE)   05/08/17  11:30    


 


Ur Propoxyphene Screen  NEGATIVE  (NEGATIVE)   05/08/17  11:30    


 


Ur Barbiturates Screen  NEGATIVE  (NEGATIVE)   05/08/17  11:30    


 


Ur Tricyclics Screen  NEGATIVE  (NEGATIVE)   05/08/17  11:30    


 


Ur Phencyclidine Scrn  NEGATIVE  (NEGATIVE)   05/08/17  11:30    


 


Ur Amphetamine Screen  NEGATIVE  (NEGATIVE)   05/08/17  11:30    


 


U Methamphetamines Scrn  NEGATIVE  (NEGATIVE)   05/08/17  11:30    


 


U Benzodiazepines Scrn  NEGATIVE  (NEGATIVE)   05/08/17  11:30    


 


Urine Cocaine Screen  NEGATIVE  (NEGATIVE)   05/08/17  11:30    


 


U Cannabinoids Screen  NEGATIVE  (NEGATIVE)   05/08/17  11:30    


 


Ethyl Alcohol  < 5.0 mg/dL  05/08/17  09:55

## 2017-05-10 LAB
ALBUMIN/GLOB SERPL: 1.5 {RATIO} (ref 1–2.2)
ANION GAP SERPL CALCULATED.4IONS-SCNC: 6 MMOL/L (ref 6–13)
BASOPHILS NFR BLD AUTO: 0.1 10^3/UL (ref 0–0.1)
BASOPHILS NFR BLD AUTO: 1.1 %
BILIRUB BLD-MCNC: 0.8 MG/DL (ref 0.2–1)
BUN SERPL-MCNC: < 5 MG/DL (ref 6–20)
CALCIUM UR-MCNC: 8.4 MG/DL (ref 8.5–10.3)
CHLORIDE SERPL-SCNC: 109 MMOL/L (ref 101–111)
CO2 SERPL-SCNC: 24 MMOL/L (ref 21–32)
CREAT SERPLBLD-SCNC: 0.5 MG/DL (ref 0.6–1.2)
EOSINOPHIL # BLD AUTO: 0.2 10^3/UL (ref 0–0.7)
EOSINOPHIL NFR BLD AUTO: 3.4 %
ERYTHROCYTE [DISTWIDTH] IN BLOOD BY AUTOMATED COUNT: 14 % (ref 12–15)
GFRSERPLBLD MDRD-ARVRAT: 173 ML/MIN/{1.73_M2} (ref 89–?)
GLOBULIN SER-MCNC: 2.4 G/DL (ref 2.1–4.2)
GLUCOSE SERPL-MCNC: 117 MG/DL (ref 70–100)
HCT VFR BLD AUTO: 37 % (ref 42–52)
HGB UR QL STRIP: 12.5 G/DL (ref 14–18)
LYMPHOCYTES # SPEC AUTO: 2 10^3/UL (ref 1.5–3.5)
LYMPHOCYTES NFR BLD AUTO: 38.7 %
MCH RBC QN AUTO: 33.1 PG (ref 27–31)
MCHC RBC AUTO-ENTMCNC: 33.7 G/DL (ref 32–36)
MCV RBC AUTO: 98 FL (ref 80–94)
MONOCYTES # BLD AUTO: 0.8 10^3/UL (ref 0–1)
MONOCYTES NFR BLD AUTO: 14.7 %
NEUTROPHILS # BLD AUTO: 2.2 10^3/UL (ref 1.5–6.6)
NEUTROPHILS # SNV AUTO: 5.3 X10^3/UL (ref 4.8–10.8)
NEUTROPHILS NFR BLD AUTO: 42.1 %
NRBC # BLD AUTO: 0.1 /100WBC
PDW BLD AUTO: 8 FL (ref 7.4–11.4)
POTASSIUM SERPL-SCNC: 3.4 MMOL/L (ref 3.5–5)
PROT SPEC-MCNC: 5.9 G/DL (ref 6.7–8.2)
RBC MAR: 3.77 10^6/UL (ref 4.7–6.1)
SODIUM SERPLBLD-SCNC: 139 MMOL/L (ref 135–145)
WBC # BLD: 5.3 X10^3/UL

## 2017-05-10 RX ADMIN — HEPARIN SODIUM SCH UNIT: 5000 INJECTION, SOLUTION INTRAVENOUS; SUBCUTANEOUS at 08:27

## 2017-05-10 RX ADMIN — SODIUM CHLORIDE SCH MLS/HR: 9 INJECTION, SOLUTION INTRAVENOUS at 08:56

## 2017-05-10 RX ADMIN — Medication SCH MLS/HR: at 12:39

## 2017-05-10 RX ADMIN — Medication SCH MLS/HR: at 18:02

## 2017-05-10 RX ADMIN — HEPARIN SODIUM SCH UNIT: 5000 INJECTION, SOLUTION INTRAVENOUS; SUBCUTANEOUS at 21:29

## 2017-05-10 RX ADMIN — Medication SCH MLS/HR: at 04:47

## 2017-05-10 RX ADMIN — POTASSIUM CHLORIDE SCH MLS/HR: 7.46 INJECTION, SOLUTION INTRAVENOUS at 12:37

## 2017-05-10 RX ADMIN — Medication SCH: at 08:56

## 2017-05-10 RX ADMIN — SODIUM CHLORIDE SCH MG: 9 INJECTION, SOLUTION INTRAVENOUS at 06:25

## 2017-05-10 RX ADMIN — NICOTINE SCH PATCH: 14 PATCH TRANSDERMAL at 08:27

## 2017-05-10 RX ADMIN — SODIUM CHLORIDE, PRESERVATIVE FREE SCH ML: 5 INJECTION INTRAVENOUS at 22:00

## 2017-05-10 RX ADMIN — Medication SCH MLS/HR: at 09:54

## 2017-05-10 RX ADMIN — POTASSIUM CHLORIDE, DEXTROSE MONOHYDRATE AND SODIUM CHLORIDE SCH: 150; 5; 450 INJECTION, SOLUTION INTRAVENOUS at 01:22

## 2017-05-10 RX ADMIN — POTASSIUM CHLORIDE SCH MLS/HR: 7.46 INJECTION, SOLUTION INTRAVENOUS at 10:21

## 2017-05-10 RX ADMIN — SODIUM CHLORIDE, PRESERVATIVE FREE SCH ML: 5 INJECTION INTRAVENOUS at 06:25

## 2017-05-10 RX ADMIN — SODIUM CHLORIDE, PRESERVATIVE FREE SCH ML: 5 INJECTION INTRAVENOUS at 13:26

## 2017-05-10 RX ADMIN — POTASSIUM CHLORIDE SCH MLS/HR: 7.46 INJECTION, SOLUTION INTRAVENOUS at 09:20

## 2017-05-10 RX ADMIN — POTASSIUM CHLORIDE SCH MLS/HR: 7.46 INJECTION, SOLUTION INTRAVENOUS at 11:31

## 2017-05-10 RX ADMIN — POTASSIUM CHLORIDE, DEXTROSE MONOHYDRATE AND SODIUM CHLORIDE SCH MLS/HR: 150; 5; 450 INJECTION, SOLUTION INTRAVENOUS at 06:48

## 2017-05-11 LAB
ALBUMIN/GLOB SERPL: 1.3 {RATIO} (ref 1–2.2)
ANION GAP SERPL CALCULATED.4IONS-SCNC: 8 MMOL/L (ref 6–13)
BASOPHILS NFR BLD AUTO: 0.1 10^3/UL (ref 0–0.1)
BASOPHILS NFR BLD AUTO: 1.3 %
BILIRUB BLD-MCNC: 0.6 MG/DL (ref 0.2–1)
BUN SERPL-MCNC: < 5 MG/DL (ref 6–20)
CALCIUM UR-MCNC: 8.7 MG/DL (ref 8.5–10.3)
CHLORIDE SERPL-SCNC: 110 MMOL/L (ref 101–111)
CO2 SERPL-SCNC: 24 MMOL/L (ref 21–32)
CREAT SERPLBLD-SCNC: 0.5 MG/DL (ref 0.6–1.2)
EOSINOPHIL # BLD AUTO: 0.1 10^3/UL (ref 0–0.7)
EOSINOPHIL NFR BLD AUTO: 2.6 %
ERYTHROCYTE [DISTWIDTH] IN BLOOD BY AUTOMATED COUNT: 14 % (ref 12–15)
GFRSERPLBLD MDRD-ARVRAT: 173 ML/MIN/{1.73_M2} (ref 89–?)
GLOBULIN SER-MCNC: 2.6 G/DL (ref 2.1–4.2)
GLUCOSE SERPL-MCNC: 97 MG/DL (ref 70–100)
HCT VFR BLD AUTO: 39.1 % (ref 42–52)
HGB UR QL STRIP: 13.1 G/DL (ref 14–18)
LYMPHOCYTES # SPEC AUTO: 1.5 10^3/UL (ref 1.5–3.5)
LYMPHOCYTES NFR BLD AUTO: 26.5 %
MCH RBC QN AUTO: 33.4 PG (ref 27–31)
MCHC RBC AUTO-ENTMCNC: 33.5 G/DL (ref 32–36)
MCV RBC AUTO: 99.7 FL (ref 80–94)
MONOCYTES # BLD AUTO: 0.8 10^3/UL (ref 0–1)
MONOCYTES NFR BLD AUTO: 14.3 %
NEUTROPHILS # BLD AUTO: 3.1 10^3/UL (ref 1.5–6.6)
NEUTROPHILS # SNV AUTO: 5.7 X10^3/UL (ref 4.8–10.8)
NEUTROPHILS NFR BLD AUTO: 55.3 %
NRBC # BLD AUTO: 0 /100WBC
PDW BLD AUTO: 8.2 FL (ref 7.4–11.4)
POTASSIUM SERPL-SCNC: 3.8 MMOL/L (ref 3.5–5)
PROT SPEC-MCNC: 6 G/DL (ref 6.7–8.2)
RBC MAR: 3.92 10^6/UL (ref 4.7–6.1)
SODIUM SERPLBLD-SCNC: 142 MMOL/L (ref 135–145)
WBC # BLD: 5.7 X10^3/UL

## 2017-05-11 RX ADMIN — POTASSIUM CHLORIDE, DEXTROSE MONOHYDRATE AND SODIUM CHLORIDE SCH MLS/HR: 150; 5; 450 INJECTION, SOLUTION INTRAVENOUS at 06:53

## 2017-05-11 RX ADMIN — SODIUM CHLORIDE, PRESERVATIVE FREE SCH ML: 5 INJECTION INTRAVENOUS at 14:06

## 2017-05-11 RX ADMIN — Medication SCH MLS/HR: at 14:13

## 2017-05-11 RX ADMIN — SODIUM CHLORIDE SCH MG: 9 INJECTION, SOLUTION INTRAVENOUS at 06:53

## 2017-05-11 RX ADMIN — Medication SCH: at 08:55

## 2017-05-11 RX ADMIN — Medication SCH MLS/HR: at 00:57

## 2017-05-11 RX ADMIN — LORAZEPAM PRN MG: 2 INJECTION, SOLUTION INTRAMUSCULAR; INTRAVENOUS at 23:31

## 2017-05-11 RX ADMIN — NYSTATIN SCH ML: 100000 SUSPENSION ORAL at 17:34

## 2017-05-11 RX ADMIN — HEPARIN SODIUM SCH UNIT: 5000 INJECTION, SOLUTION INTRAVENOUS; SUBCUTANEOUS at 20:54

## 2017-05-11 RX ADMIN — POTASSIUM CHLORIDE, DEXTROSE MONOHYDRATE AND SODIUM CHLORIDE SCH MLS/HR: 150; 5; 450 INJECTION, SOLUTION INTRAVENOUS at 06:54

## 2017-05-11 RX ADMIN — HEPARIN SODIUM SCH UNIT: 5000 INJECTION, SOLUTION INTRAVENOUS; SUBCUTANEOUS at 08:47

## 2017-05-11 RX ADMIN — SODIUM CHLORIDE SCH MLS/HR: 9 INJECTION, SOLUTION INTRAVENOUS at 09:35

## 2017-05-11 RX ADMIN — NYSTATIN SCH ML: 100000 SUSPENSION ORAL at 20:55

## 2017-05-11 RX ADMIN — POTASSIUM CHLORIDE, DEXTROSE MONOHYDRATE AND SODIUM CHLORIDE SCH MLS/HR: 150; 5; 450 INJECTION, SOLUTION INTRAVENOUS at 20:50

## 2017-05-11 RX ADMIN — SODIUM CHLORIDE, PRESERVATIVE FREE SCH ML: 5 INJECTION INTRAVENOUS at 21:30

## 2017-05-11 RX ADMIN — POTASSIUM CHLORIDE, DEXTROSE MONOHYDRATE AND SODIUM CHLORIDE SCH MLS/HR: 150; 5; 450 INJECTION, SOLUTION INTRAVENOUS at 20:52

## 2017-05-11 RX ADMIN — SODIUM CHLORIDE, PRESERVATIVE FREE SCH ML: 5 INJECTION INTRAVENOUS at 06:55

## 2017-05-11 RX ADMIN — NICOTINE SCH PATCH: 14 PATCH TRANSDERMAL at 08:46

## 2017-05-11 RX ADMIN — NYSTATIN SCH: 100000 SUSPENSION ORAL at 14:17

## 2017-05-11 RX ADMIN — Medication SCH MLS/HR: at 07:36

## 2017-05-11 RX ADMIN — Medication SCH: at 08:56

## 2017-05-11 RX ADMIN — NYSTATIN SCH ML: 100000 SUSPENSION ORAL at 14:06

## 2017-05-11 NOTE — PROVIDER PROGRESS NOTE
Assessment/Plan





- Problem List


(1) Alcohol withdrawal delirium, acute, mixed level of activity


Assessment/Plan: 


Winston is able to be awakened and have a short conversation today. He is still 

very aggitated and uncontrollable without the ativan drip even at a lower dose


He is taking some oral and has no swallowing problems.


Will advance diet.


Lab is essentially nml.








- Current Meds


Current Meds: 





 Current Medications











Generic Name Dose Route Start Last Admin





  Trade Name Freq  PRN Reason Stop Dose Admin


 


Heparin Sodium (Porcine)  5,000 unit 05/08/17 21:00 05/11/17 08:47





    SUBQ   5,000 unit





  BID DEBORAH   Administration


 


Potassium Chloride/Dextrose/Sod Cl  1,000 mls @ 100 mls/hr 05/08/17 18:00 05/11/ 17 06:54





  D5.45ns W/20 Meq Kcl  IV   100 mls/hr





  .Q10H DEBORAH   Administration


 


Multivitamins 10 ml/ Thiamine  1,011.2 mls @ 100 mls/hr 05/09/17 09:00 05/11/17 

09:35





HCl 100 mg/ Folic Acid 1 mg/  IV   100 mls/hr





Sodium Chloride  DAILY DEBORAH   Administration


 


Lorazepam  100 mls @ 5 mls/hr 05/09/17 15:00 05/11/17 09:04





  Ativan  IV   15 mg/hr





  .Q20H DEBORAH   Titration





  Protocol   





  5 MG/HR   


 


Lorazepam  2 mg 05/08/17 17:36 05/09/17 17:53





  Ativan Inj  IVP   1 mg





  Q30M PRN   Administration





  CIWA>8   





  Protocol   


 


Lorazepam  1 - 2 mg 05/09/17 18:00 05/09/17 18:44





  Ativan Inj  IVP   1 mg





  Q30M PRN   Administration





  Lorazepam drip protocol   





  Protocol   


 


Nicotine  1 patch 05/09/17 09:00 05/11/17 08:46





  Nicoderm  TOP   1 patch





  DAILY DEBORAH   Administration


 


Pantoprazole Sodium  40 mg 05/09/17 07:00 05/11/17 06:53





  Protonix  IVP   40 mg





  QDAC DEBORAH   Administration


 


Prenatal Multivit/Folic Acid/Iron  1 tab 05/09/17 08:00 05/11/17 08:55





  Trinatal Rx 1  PO   Not Given





  DAILYWM DEBORAH   


 


Sodium Chloride  10 ml 05/08/17 22:00 05/11/17 06:55





  Normal Saline Flush 0.9%  IVP   10 ml





  Q8HR DEBORAH   Administration


 


Thiamine HCl  100 mg 05/09/17 09:00 05/11/17 08:56





  Vitamin B-1  PO   Not Given





  DAILY DEBORAH   














- Lab Result


Fish Bone Diagrams: 


 05/11/17 04:40





 05/11/17 04:40





- Additional Planning


My Orders: 





My Active Orders





05/11/17 Lunch


DIET [Soft Mechanical Diet] [DIET] 





05/12/17 05:00


CBC - COMP BLD CT W/AUTO DIFF [HEME] DAILYLAB 


COMPREHENSIVE METABOLIC PANEL [CHEM] DAILYLAB 


MG [MAGNESIUM] [CHEM] DAILYLAB 





05/13/17 05:00


CBC - COMP BLD CT W/AUTO DIFF [HEME] DAILYLAB 


COMPREHENSIVE METABOLIC PANEL [CHEM] DAILYLAB 


MG [MAGNESIUM] [CHEM] DAILYLAB 





05/14/17 05:00


CBC - COMP BLD CT W/AUTO DIFF [HEME] DAILYLAB 


COMPREHENSIVE METABOLIC PANEL [CHEM] DAILYLAB 


MG [MAGNESIUM] [CHEM] DAILYLAB 





05/15/17 05:00


CBC - COMP BLD CT W/AUTO DIFF [HEME] DAILYLAB 


COMPREHENSIVE METABOLIC PANEL [CHEM] DAILYLAB 














Subjective





- Subjective


Patient Reports: Resting Comfortably


Nursing Reports: Confused





Objective


Vital Signs: 





 Vital Signs - 24 hr











  05/10/17 05/10/17 05/10/17





  12:00 12:56 14:00


 


Temperature  36.6 C 


 


Heart Rate [ 80 67 76





Monitoring   





electrodes]   


 


Respiratory 21 15 22





Rate   


 


Blood Pressure 155/78 H  130/78





[Right Brachial   





artery]   


 


O2 Saturation 95 94 94














  05/10/17 05/10/17 05/10/17





  14:49 16:00 17:00


 


Temperature  36.6 C 


 


Heart Rate [ 75 61 60





Monitoring   





electrodes]   


 


Respiratory 20 14 14





Rate   


 


Blood Pressure 123/82 H 126/72 126/73





[Right Brachial   





artery]   


 


O2 Saturation 94 95 98














  05/10/17 05/10/17 05/10/17





  18:00 19:00 20:00


 


Temperature   36.8 C


 


Heart Rate [ 96 79 63





Monitoring   





electrodes]   


 


Respiratory 14 23 13





Rate   


 


Blood Pressure 117/66 136/78 H 112/68





[Right Brachial   





artery]   


 


O2 Saturation 96 92 96














  05/10/17 05/10/17 05/10/17





  21:00 22:00 23:00


 


Temperature   


 


Heart Rate [ 62 82 77





Monitoring   





electrodes]   


 


Respiratory 17 19 17





Rate   


 


Blood Pressure 130/59 L 89/22 L 134/75 H





[Right Brachial   





artery]   


 


O2 Saturation 98 95 96














  05/11/17 05/11/17 05/11/17





  00:00 01:00 02:00


 


Temperature 36.8 C  


 


Heart Rate [ 62 58 L 62





Monitoring   





electrodes]   


 


Respiratory 13 11 L 14





Rate   


 


Blood Pressure 129/68 131/77 H 127/77





[Right Brachial   





artery]   


 


O2 Saturation 95 99 97














  05/11/17 05/11/17 05/11/17





  03:00 04:00 05:00


 


Temperature  36.8 C 


 


Heart Rate [ 60 60 63





Monitoring   





electrodes]   


 


Respiratory 14 13 13





Rate   


 


Blood Pressure 132/69 H 124/79 106/75





[Right Brachial   





artery]   


 


O2 Saturation 97 97 97














  05/11/17 05/11/17 05/11/17





  06:00 06:56 07:42


 


Temperature   37.0 C


 


Heart Rate [ 62 60 68





Monitoring   





electrodes]   


 


Respiratory 14 15 16





Rate   


 


Blood Pressure 139/98 H 130/79 136/76 H





[Right Brachial   





artery]   


 


O2 Saturation 97 99 98














  05/11/17 05/11/17 05/11/17





  08:41 09:57 11:00


 


Temperature  36.5 C 36.3 C L


 


Heart Rate [ 72 67 69





Monitoring   





electrodes]   


 


Respiratory 14 11 L 15





Rate   


 


Blood Pressure 135/80 H 138/76 H 138/76 H





[Right Brachial   





artery]   


 


O2 Saturation 95 95 95








 Oxygen











O2 Source                      Room air














I&O (Last 24 Hrs): 





 Intake and Output Totals x24h











 05/09/17 05/10/17 05/11/17





 23:59 23:59 23:59


 


Intake Total 2896 3532 1497


 


Output Total 450 660 500


 


Balance 2446 2872 997











General: Alert, Cooperative


HEENT: PERRLA, EOMI


Neck: No JVD, No thyromegaly


Lymphatic: no adenopathy


Neuro: Alert, Disoriented, Speech Slurred


Cardiovascular: Regular rate, No murmurs


Respiratory: No respiratory distress, Breath sounds nml


Extremities: No cyanosis, No edema


Skin: No rashes, No breakdown





- Results


Results: 





 Laboratory Results











WBC  5.7 x10^3/uL (4.8-10.8)   05/11/17  04:40    


 


RBC  3.92 10^6/uL (4.70-6.10)  L  05/11/17  04:40    


 


Hgb  13.1 g/dL (14.0-18.0)  L  05/11/17  04:40    


 


Hct  39.1 % (42.0-52.0)  L  05/11/17  04:40    


 


MCV  99.7 fL (80.0-94.0)  H  05/11/17  04:40    


 


MCH  33.4 pg (27.0-31.0)  H  05/11/17  04:40    


 


MCHC  33.5 g/dL (32.0-36.0)   05/11/17  04:40    


 


RDW  14.0 % (12.0-15.0)   05/11/17  04:40    


 


Plt Count  240 10^3/uL (130-450)   05/11/17  04:40    


 


MPV  8.2 fL (7.4-11.4)   05/11/17  04:40    


 


Neut #  3.1 10^3/uL (1.5-6.6)   05/11/17  04:40    


 


Lymph #  1.5 10^3/uL (1.5-3.5)   05/11/17  04:40    


 


Mono #  0.8 10^3/uL (0.0-1.0)   05/11/17  04:40    


 


Eos #  0.1 10^3/uL (0.0-0.7)   05/11/17  04:40    


 


Baso #  0.1 10^3/uL (0.0-0.1)   05/11/17  04:40    


 


Absolute Nucleated RBC  0.00 x10^3/uL  05/11/17  04:40    


 


Nucleated RBCs  0.0 /100WBC  05/11/17  04:40    


 


PT  12.7 secs (9.9-12.6)  H  05/09/17  04:45    


 


INR  1.1  (0.8-1.2)   05/09/17  04:45    


 


Sodium  142 mmol/L (135-145)   05/11/17  04:40    


 


Potassium  3.8 mmol/L (3.5-5.0)   05/11/17  04:40    


 


Chloride  110 mmol/L (101-111)   05/11/17  04:40    


 


Carbon Dioxide  24 mmol/L (21-32)   05/11/17  04:40    


 


Anion Gap  8.0  (6-13)   05/11/17  04:40    


 


BUN  < 5 mg/dL (6-20)  L  05/11/17  04:40    


 


Creatinine  0.5 mg/dL (0.6-1.2)  L  05/11/17  04:40    


 


Estimated GFR (MDRD)  173  (>89)   05/11/17  04:40    


 


Glucose  97 mg/dL ()   05/11/17  04:40    


 


Calcium  8.7 mg/dL (8.5-10.3)   05/11/17  04:40    


 


Phosphorus  3.3 mg/dL (2.5-4.6)   05/09/17  04:45    


 


Magnesium  2.2 mg/dL (1.7-2.8)   05/09/17  04:45    


 


Total Bilirubin  0.6 mg/dL (0.2-1.0)   05/11/17  04:40    


 


AST  24 IU/L (10-42)   05/11/17  04:40    


 


ALT  20 IU/L (10-60)   05/11/17  04:40    


 


Alkaline Phosphatase  51 IU/L ()   05/11/17  04:40    


 


Total Protein  6.0 g/dL (6.7-8.2)  L  05/11/17  04:40    


 


Albumin  3.4 g/dL (3.2-5.5)   05/11/17  04:40    


 


Globulin  2.6 g/dL (2.1-4.2)   05/11/17  04:40    


 


Albumin/Globulin Ratio  1.3  (1.0-2.2)   05/11/17  04:40    


 


Lipase  45 U/L (22-51)   05/08/17  09:55    


 


Urine Opiates Screen  NEGATIVE  (NEGATIVE)   05/08/17  11:30    


 


Ur Oxycodone Screen  NEGATIVE  (NEGATIVE)   05/08/17  11:30    


 


Urine Methadone Screen  NEGATIVE  (NEGATIVE)   05/08/17  11:30    


 


Ur Propoxyphene Screen  NEGATIVE  (NEGATIVE)   05/08/17  11:30    


 


Ur Barbiturates Screen  NEGATIVE  (NEGATIVE)   05/08/17  11:30    


 


Ur Tricyclics Screen  NEGATIVE  (NEGATIVE)   05/08/17  11:30    


 


Ur Phencyclidine Scrn  NEGATIVE  (NEGATIVE)   05/08/17  11:30    


 


Ur Amphetamine Screen  NEGATIVE  (NEGATIVE)   05/08/17  11:30    


 


U Methamphetamines Scrn  NEGATIVE  (NEGATIVE)   05/08/17  11:30    


 


U Benzodiazepines Scrn  NEGATIVE  (NEGATIVE)   05/08/17  11:30    


 


Urine Cocaine Screen  NEGATIVE  (NEGATIVE)   05/08/17  11:30    


 


U Cannabinoids Screen  NEGATIVE  (NEGATIVE)   05/08/17  11:30    


 


Ethyl Alcohol  < 5.0 mg/dL  05/08/17  09:55

## 2017-05-12 LAB
ALBUMIN/GLOB SERPL: 1.2 {RATIO} (ref 1–2.2)
ANION GAP SERPL CALCULATED.4IONS-SCNC: 8 MMOL/L (ref 6–13)
BASOPHILS NFR BLD AUTO: 0 10^3/UL (ref 0–0.1)
BASOPHILS NFR BLD AUTO: 0.9 %
BILIRUB BLD-MCNC: 0.6 MG/DL (ref 0.2–1)
BUN SERPL-MCNC: < 5 MG/DL (ref 6–20)
CALCIUM UR-MCNC: 8.7 MG/DL (ref 8.5–10.3)
CHLORIDE SERPL-SCNC: 108 MMOL/L (ref 101–111)
CO2 SERPL-SCNC: 26 MMOL/L (ref 21–32)
CREAT SERPLBLD-SCNC: 0.5 MG/DL (ref 0.6–1.2)
EOSINOPHIL # BLD AUTO: 0.1 10^3/UL (ref 0–0.7)
EOSINOPHIL NFR BLD AUTO: 2.3 %
ERYTHROCYTE [DISTWIDTH] IN BLOOD BY AUTOMATED COUNT: 13.9 % (ref 12–15)
GFRSERPLBLD MDRD-ARVRAT: 173 ML/MIN/{1.73_M2} (ref 89–?)
GLOBULIN SER-MCNC: 2.9 G/DL (ref 2.1–4.2)
GLUCOSE SERPL-MCNC: 99 MG/DL (ref 70–100)
HCT VFR BLD AUTO: 38.3 % (ref 42–52)
HGB UR QL STRIP: 13 G/DL (ref 14–18)
LYMPHOCYTES # SPEC AUTO: 1.5 10^3/UL (ref 1.5–3.5)
LYMPHOCYTES NFR BLD AUTO: 27.1 %
MAGNESIUM SERPL-MCNC: 1.9 MG/DL (ref 1.7–2.8)
MCH RBC QN AUTO: 33.6 PG (ref 27–31)
MCHC RBC AUTO-ENTMCNC: 34.1 G/DL (ref 32–36)
MCV RBC AUTO: 98.6 FL (ref 80–94)
MONOCYTES # BLD AUTO: 0.8 10^3/UL (ref 0–1)
MONOCYTES NFR BLD AUTO: 14.2 %
NEUTROPHILS # BLD AUTO: 3.1 10^3/UL (ref 1.5–6.6)
NEUTROPHILS # SNV AUTO: 5.6 X10^3/UL (ref 4.8–10.8)
NEUTROPHILS NFR BLD AUTO: 55.5 %
NRBC # BLD AUTO: 0 /100WBC
PDW BLD AUTO: 8.6 FL (ref 7.4–11.4)
POTASSIUM SERPL-SCNC: 3.8 MMOL/L (ref 3.5–5)
PROT SPEC-MCNC: 6.4 G/DL (ref 6.7–8.2)
RBC MAR: 3.88 10^6/UL (ref 4.7–6.1)
SODIUM SERPLBLD-SCNC: 142 MMOL/L (ref 135–145)
WBC # BLD: 5.6 X10^3/UL

## 2017-05-12 RX ADMIN — Medication SCH MLS/HR: at 01:09

## 2017-05-12 RX ADMIN — SODIUM CHLORIDE, PRESERVATIVE FREE SCH ML: 5 INJECTION INTRAVENOUS at 06:10

## 2017-05-12 RX ADMIN — SODIUM CHLORIDE SCH MG: 9 INJECTION, SOLUTION INTRAVENOUS at 06:10

## 2017-05-12 RX ADMIN — NYSTATIN SCH ML: 100000 SUSPENSION ORAL at 21:03

## 2017-05-12 RX ADMIN — Medication SCH MG: at 08:35

## 2017-05-12 RX ADMIN — SODIUM CHLORIDE, PRESERVATIVE FREE SCH ML: 5 INJECTION INTRAVENOUS at 21:04

## 2017-05-12 RX ADMIN — SODIUM CHLORIDE SCH: 9 INJECTION, SOLUTION INTRAVENOUS at 10:24

## 2017-05-12 RX ADMIN — NICOTINE SCH PATCH: 14 PATCH TRANSDERMAL at 08:38

## 2017-05-12 RX ADMIN — NYSTATIN SCH ML: 100000 SUSPENSION ORAL at 08:48

## 2017-05-12 RX ADMIN — NYSTATIN SCH ML: 100000 SUSPENSION ORAL at 16:43

## 2017-05-12 RX ADMIN — HEPARIN SODIUM SCH UNIT: 5000 INJECTION, SOLUTION INTRAVENOUS; SUBCUTANEOUS at 08:42

## 2017-05-12 RX ADMIN — NYSTATIN SCH ML: 100000 SUSPENSION ORAL at 18:36

## 2017-05-12 RX ADMIN — Medication SCH TAB: at 08:34

## 2017-05-12 RX ADMIN — HEPARIN SODIUM SCH UNIT: 5000 INJECTION, SOLUTION INTRAVENOUS; SUBCUTANEOUS at 20:59

## 2017-05-12 RX ADMIN — SODIUM CHLORIDE, PRESERVATIVE FREE SCH: 5 INJECTION INTRAVENOUS at 16:48

## 2017-05-12 NOTE — PROVIDER PROGRESS NOTE
Assessment/Plan





- Problem List


(1) Alcohol withdrawal delirium, acute, mixed level of activity


Assessment/Plan: 


Young is much improved this am.


Ativan drip has been weaned to 2 mgs


He is talking, answering questions appropriately.


He is displaying concern for his wife and their future.


Mentation is slow.








- Current Meds


Current Meds: 





 Current Medications











Generic Name Dose Route Start Last Admin





  Trade Name Freq  PRN Reason Stop Dose Admin


 


Heparin Sodium (Porcine)  5,000 unit 05/08/17 21:00 05/12/17 08:42





    SUBQ   5,000 unit





  BID DEBORAH   Administration


 


Multivitamins 10 ml/ Thiamine  1,011.2 mls @ 100 mls/hr 05/09/17 09:00 05/12/17 

10:24





HCl 100 mg/ Folic Acid 1 mg/  IV   Not Given





Sodium Chloride  DAILY DEBORAH   


 


Lorazepam  2 mg 05/08/17 17:36 05/11/17 23:31





  Ativan Inj  IVP   2 mg





  Q30M PRN   Administration





  CIWA>8   





  Protocol   


 


Lorazepam  1 - 2 mg 05/09/17 18:00 05/09/17 18:44





  Ativan Inj  IVP   1 mg





  Q30M PRN   Administration





  Lorazepam drip protocol   





  Protocol   


 


Nicotine  1 patch 05/09/17 09:00 05/12/17 08:38





  Nicoderm  TOP   1 patch





  DAILY DEBORAH   Administration


 


Nystatin  5 ml 05/11/17 12:00 05/12/17 08:48





  Mycostatin  PO   5 ml





  QID DEBORAH   Administration


 


Pantoprazole Sodium  40 mg 05/09/17 07:00 05/12/17 06:10





  Protonix  IVP   40 mg





  QDAC DEBORAH   Administration


 


Prenatal Multivit/Folic Acid/Iron  1 tab 05/09/17 08:00 05/12/17 08:34





  Trinatal Rx 1  PO   1 tab





  DAILYWM DEBORAH   Administration


 


Sodium Chloride  10 ml 05/08/17 17:27 05/12/17 10:00





  Normal Saline Flush 0.9%  IVP   10 ml





  PRN PRN   Administration





  AS NEEDED PER PROVIDER ORDERS   


 


Sodium Chloride  10 ml 05/08/17 22:00 05/12/17 06:10





  Normal Saline Flush 0.9%  IVP   10 ml





  Q8HR DEBORAH   Administration


 


Thiamine HCl  100 mg 05/09/17 09:00 05/12/17 08:35





  Vitamin B-1  PO   100 mg





  DAILY DEBORAH   Administration














- Lab Result


Fish Bone Diagrams: 


 05/12/17 04:53





 05/12/17 04:53





- Additional Planning


My Orders: 





My Active Orders





05/11/17 12:00


Nystatin [Mycostatin]   5 ml PO QID 





05/12/17


Evaluate and Treat OT [OT] Routine 





05/12/17 Lunch


DIET [Regular Diet] [DIET] 





05/13/17 05:00


CBC - COMP BLD CT W/AUTO DIFF [HEME] DAILYLAB 


COMPREHENSIVE METABOLIC PANEL [CHEM] DAILYLAB 


MG [MAGNESIUM] [CHEM] DAILYLAB 





05/14/17 05:00


CBC - COMP BLD CT W/AUTO DIFF [HEME] DAILYLAB 


COMPREHENSIVE METABOLIC PANEL [CHEM] DAILYLAB 


MG [MAGNESIUM] [CHEM] DAILYLAB 





05/15/17 05:00


CBC - COMP BLD CT W/AUTO DIFF [HEME] DAILYLAB 


COMPREHENSIVE METABOLIC PANEL [CHEM] DAILYLAB 














Objective


Vital Signs: 





 Vital Signs - 24 hr











  05/11/17 05/11/17 05/11/17





  13:00 14:00 14:59


 


Temperature  36.3 C L 


 


Heart Rate [ 66 56 L 66





Monitoring   





electrodes]   


 


Respiratory 12 13 27 H





Rate   


 


Blood Pressure 95/49 L 102/55 L 100/52 L





[Right Brachial   





artery]   


 


O2 Saturation 93 98 96














  05/11/17 05/11/17 05/11/17





  16:00 17:00 18:00


 


Temperature 36.7 C  


 


Heart Rate [ 60 74 65





Monitoring   





electrodes]   


 


Respiratory 16 17 13





Rate   


 


Blood Pressure 106/64 126/74 133/89 H





[Right Brachial   





artery]   


 


O2 Saturation 94 96 100














  05/11/17 05/11/17 05/11/17





  18:47 20:00 21:00


 


Temperature  36.8 C 


 


Heart Rate [ 88 65 65





Monitoring   





electrodes]   


 


Respiratory 13 16 16





Rate   


 


Blood Pressure 133/89 H 136/57 H 141/87 H





[Right Brachial   





artery]   


 


O2 Saturation 99 97 97














  05/11/17 05/11/17 05/12/17





  22:00 23:00 00:00


 


Temperature   36.7 C


 


Heart Rate [ 64 67 75





Monitoring   





electrodes]   


 


Respiratory 16 16 18





Rate   


 


Blood Pressure 135/58 H 107/63 150/83 H





[Right Brachial   





artery]   


 


O2 Saturation 95 95 96














  05/12/17 05/12/17 05/12/17





  01:00 02:00 03:00


 


Temperature   


 


Heart Rate [ 60 60 60





Monitoring   





electrodes]   


 


Respiratory 14 14 14





Rate   


 


Blood Pressure 137/71 H 123/68 133/79 H





[Right Brachial   





artery]   


 


O2 Saturation 97 97 97














  05/12/17 05/12/17 05/12/17





  04:00 05:00 06:00


 


Temperature 36.7 C  


 


Heart Rate [ 57 L 58 L 55 L





Monitoring   





electrodes]   


 


Respiratory 16 14 16





Rate   


 


Blood Pressure 123/76 123/71 129/76





[Right Brachial   





artery]   


 


O2 Saturation 95 98 96














  05/12/17 05/12/17 05/12/17





  07:00 07:45 09:00


 


Temperature  36.0 C L 


 


Heart Rate [ 60 71 78





Monitoring   





electrodes]   


 


Respiratory 16 13 15





Rate   


 


Blood Pressure 115/61 134/83 H 120/73





[Right Brachial   





artery]   


 


O2 Saturation 94 93 98














  05/12/17 05/12/17 05/12/17





  10:00 11:00 11:53


 


Temperature   


 


Heart Rate [ 81 79 65





Monitoring   





electrodes]   


 


Respiratory 11 L 14 11 L





Rate   


 


Blood Pressure 135/75 H 119/77 105/69





[Right Brachial   





artery]   


 


O2 Saturation 95 98 








 Oxygen











O2 Source                      Room air














I&O (Last 24 Hrs): 





 Intake and Output Totals x24h











 05/10/17 05/11/17 05/12/17





 23:59 23:59 23:59


 


Intake Total 3532 3239 719


 


Output Total 


 


Balance 9806 7182 -636











General: Alert, Cooperative, No acute distress


HEENT: PERRLA


Neck: No JVD, No thyromegaly


Neuro: Alert, Speech Slurred


Cardiovascular: Regular rate, No murmurs


Respiratory: Chest non-tender, No respiratory distress, Breath sounds nml


Abdomen: Soft, No tenderness


Extremities: No cyanosis, No edema


Skin: No rashes, No breakdown





- Results


Results: 





 Laboratory Results











WBC  5.6 x10^3/uL (4.8-10.8)   05/12/17  04:53    


 


RBC  3.88 10^6/uL (4.70-6.10)  L  05/12/17  04:53    


 


Hgb  13.0 g/dL (14.0-18.0)  L  05/12/17  04:53    


 


Hct  38.3 % (42.0-52.0)  L  05/12/17  04:53    


 


MCV  98.6 fL (80.0-94.0)  H  05/12/17  04:53    


 


MCH  33.6 pg (27.0-31.0)  H  05/12/17  04:53    


 


MCHC  34.1 g/dL (32.0-36.0)   05/12/17  04:53    


 


RDW  13.9 % (12.0-15.0)   05/12/17  04:53    


 


Plt Count  278 10^3/uL (130-450)   05/12/17  04:53    


 


MPV  8.6 fL (7.4-11.4)   05/12/17  04:53    


 


Neut #  3.1 10^3/uL (1.5-6.6)   05/12/17  04:53    


 


Lymph #  1.5 10^3/uL (1.5-3.5)   05/12/17  04:53    


 


Mono #  0.8 10^3/uL (0.0-1.0)   05/12/17  04:53    


 


Eos #  0.1 10^3/uL (0.0-0.7)   05/12/17  04:53    


 


Baso #  0.0 10^3/uL (0.0-0.1)   05/12/17  04:53    


 


Absolute Nucleated RBC  0.00 x10^3/uL  05/12/17  04:53    


 


Nucleated RBCs  0.0 /100WBC  05/12/17  04:53    


 


PT  12.7 secs (9.9-12.6)  H  05/09/17  04:45    


 


INR  1.1  (0.8-1.2)   05/09/17  04:45    


 


Sodium  142 mmol/L (135-145)   05/12/17  04:53    


 


Potassium  3.8 mmol/L (3.5-5.0)   05/12/17  04:53    


 


Chloride  108 mmol/L (101-111)   05/12/17  04:53    


 


Carbon Dioxide  26 mmol/L (21-32)   05/12/17  04:53    


 


Anion Gap  8.0  (6-13)   05/12/17  04:53    


 


BUN  < 5 mg/dL (6-20)  L  05/12/17  04:53    


 


Creatinine  0.5 mg/dL (0.6-1.2)  L  05/12/17  04:53    


 


Estimated GFR (MDRD)  173  (>89)   05/12/17  04:53    


 


Glucose  99 mg/dL ()   05/12/17  04:53    


 


Calcium  8.7 mg/dL (8.5-10.3)   05/12/17  04:53    


 


Phosphorus  3.8 mg/dL (2.5-4.6)   05/12/17  04:53    


 


Magnesium  1.9 mg/dL (1.7-2.8)   05/12/17  04:53    


 


Total Bilirubin  0.6 mg/dL (0.2-1.0)   05/12/17  04:53    


 


AST  21 IU/L (10-42)   05/12/17  04:53    


 


ALT  19 IU/L (10-60)   05/12/17  04:53    


 


Alkaline Phosphatase  57 IU/L ()   05/12/17  04:53    


 


Total Protein  6.4 g/dL (6.7-8.2)  L  05/12/17  04:53    


 


Albumin  3.5 g/dL (3.2-5.5)   05/12/17  04:53    


 


Globulin  2.9 g/dL (2.1-4.2)   05/12/17  04:53    


 


Albumin/Globulin Ratio  1.2  (1.0-2.2)   05/12/17  04:53    


 


Lipase  45 U/L (22-51)   05/08/17  09:55    


 


Urine Opiates Screen  NEGATIVE  (NEGATIVE)   05/08/17  11:30    


 


Ur Oxycodone Screen  NEGATIVE  (NEGATIVE)   05/08/17  11:30    


 


Urine Methadone Screen  NEGATIVE  (NEGATIVE)   05/08/17  11:30    


 


Ur Propoxyphene Screen  NEGATIVE  (NEGATIVE)   05/08/17  11:30    


 


Ur Barbiturates Screen  NEGATIVE  (NEGATIVE)   05/08/17  11:30    


 


Ur Tricyclics Screen  NEGATIVE  (NEGATIVE)   05/08/17  11:30    


 


Ur Phencyclidine Scrn  NEGATIVE  (NEGATIVE)   05/08/17  11:30    


 


Ur Amphetamine Screen  NEGATIVE  (NEGATIVE)   05/08/17  11:30    


 


U Methamphetamines Scrn  NEGATIVE  (NEGATIVE)   05/08/17  11:30    


 


U Benzodiazepines Scrn  NEGATIVE  (NEGATIVE)   05/08/17  11:30    


 


Urine Cocaine Screen  NEGATIVE  (NEGATIVE)   05/08/17  11:30    


 


U Cannabinoids Screen  NEGATIVE  (NEGATIVE)   05/08/17  11:30    


 


Ethyl Alcohol  < 5.0 mg/dL  05/08/17  09:55

## 2017-05-13 LAB
ALBUMIN/GLOB SERPL: 1.3 {RATIO} (ref 1–2.2)
ANION GAP SERPL CALCULATED.4IONS-SCNC: 8 MMOL/L (ref 6–13)
BASOPHILS NFR BLD AUTO: 0.1 10^3/UL (ref 0–0.1)
BASOPHILS NFR BLD AUTO: 1.5 %
BILIRUB BLD-MCNC: 0.5 MG/DL (ref 0.2–1)
BUN SERPL-MCNC: 7 MG/DL (ref 6–20)
CALCIUM UR-MCNC: 9.1 MG/DL (ref 8.5–10.3)
CHLORIDE SERPL-SCNC: 105 MMOL/L (ref 101–111)
CO2 SERPL-SCNC: 26 MMOL/L (ref 21–32)
CREAT SERPLBLD-SCNC: 0.6 MG/DL (ref 0.6–1.2)
EOSINOPHIL # BLD AUTO: 0.1 10^3/UL (ref 0–0.7)
EOSINOPHIL NFR BLD AUTO: 2.2 %
ERYTHROCYTE [DISTWIDTH] IN BLOOD BY AUTOMATED COUNT: 13.9 % (ref 12–15)
GFRSERPLBLD MDRD-ARVRAT: 140 ML/MIN/{1.73_M2} (ref 89–?)
GLOBULIN SER-MCNC: 2.7 G/DL (ref 2.1–4.2)
GLUCOSE SERPL-MCNC: 96 MG/DL (ref 70–100)
HCT VFR BLD AUTO: 37.1 % (ref 42–52)
HGB UR QL STRIP: 12.7 G/DL (ref 14–18)
LYMPHOCYTES # SPEC AUTO: 1.9 10^3/UL (ref 1.5–3.5)
LYMPHOCYTES NFR BLD AUTO: 34.6 %
MAGNESIUM SERPL-MCNC: 1.8 MG/DL (ref 1.7–2.8)
MCH RBC QN AUTO: 33.6 PG (ref 27–31)
MCHC RBC AUTO-ENTMCNC: 34.3 G/DL (ref 32–36)
MCV RBC AUTO: 98.1 FL (ref 80–94)
MONOCYTES # BLD AUTO: 0.7 10^3/UL (ref 0–1)
MONOCYTES NFR BLD AUTO: 11.9 %
NEUTROPHILS # BLD AUTO: 2.8 10^3/UL (ref 1.5–6.6)
NEUTROPHILS # SNV AUTO: 5.5 X10^3/UL (ref 4.8–10.8)
NEUTROPHILS NFR BLD AUTO: 49.8 %
NRBC # BLD AUTO: 0.1 /100WBC
PDW BLD AUTO: 8.5 FL (ref 7.4–11.4)
POTASSIUM SERPL-SCNC: 3.6 MMOL/L (ref 3.5–5)
PROT SPEC-MCNC: 6.2 G/DL (ref 6.7–8.2)
RBC MAR: 3.78 10^6/UL (ref 4.7–6.1)
SODIUM SERPLBLD-SCNC: 139 MMOL/L (ref 135–145)
WBC # BLD: 5.5 X10^3/UL

## 2017-05-13 RX ADMIN — NICOTINE SCH PATCH: 14 PATCH TRANSDERMAL at 08:52

## 2017-05-13 RX ADMIN — NYSTATIN SCH ML: 100000 SUSPENSION ORAL at 14:29

## 2017-05-13 RX ADMIN — Medication SCH TAB: at 08:52

## 2017-05-13 RX ADMIN — SODIUM CHLORIDE, PRESERVATIVE FREE SCH ML: 5 INJECTION INTRAVENOUS at 14:29

## 2017-05-13 RX ADMIN — SODIUM CHLORIDE, PRESERVATIVE FREE SCH ML: 5 INJECTION INTRAVENOUS at 06:14

## 2017-05-13 RX ADMIN — HEPARIN SODIUM SCH UNIT: 5000 INJECTION, SOLUTION INTRAVENOUS; SUBCUTANEOUS at 08:53

## 2017-05-13 RX ADMIN — NYSTATIN SCH ML: 100000 SUSPENSION ORAL at 08:53

## 2017-05-13 RX ADMIN — HEPARIN SODIUM SCH UNIT: 5000 INJECTION, SOLUTION INTRAVENOUS; SUBCUTANEOUS at 21:24

## 2017-05-13 RX ADMIN — Medication SCH MG: at 08:53

## 2017-05-13 RX ADMIN — NYSTATIN SCH ML: 100000 SUSPENSION ORAL at 16:53

## 2017-05-13 RX ADMIN — SODIUM CHLORIDE, PRESERVATIVE FREE SCH ML: 5 INJECTION INTRAVENOUS at 21:49

## 2017-05-13 RX ADMIN — NYSTATIN SCH ML: 100000 SUSPENSION ORAL at 21:30

## 2017-05-14 LAB
ALBUMIN/GLOB SERPL: 1.2 {RATIO} (ref 1–2.2)
ANION GAP SERPL CALCULATED.4IONS-SCNC: 7 MMOL/L (ref 6–13)
BASOPHILS NFR BLD AUTO: 0.2 10^3/UL (ref 0–0.1)
BASOPHILS NFR BLD AUTO: 3.1 %
BILIRUB BLD-MCNC: 0.6 MG/DL (ref 0.2–1)
BUN SERPL-MCNC: 10 MG/DL (ref 6–20)
CALCIUM UR-MCNC: 9 MG/DL (ref 8.5–10.3)
CHLORIDE SERPL-SCNC: 105 MMOL/L (ref 101–111)
CO2 SERPL-SCNC: 28 MMOL/L (ref 21–32)
CREAT SERPLBLD-SCNC: 0.6 MG/DL (ref 0.6–1.2)
EOSINOPHIL # BLD AUTO: 0.2 10^3/UL (ref 0–0.7)
EOSINOPHIL NFR BLD AUTO: 2.4 %
ERYTHROCYTE [DISTWIDTH] IN BLOOD BY AUTOMATED COUNT: 13.7 % (ref 12–15)
GFRSERPLBLD MDRD-ARVRAT: 140 ML/MIN/{1.73_M2} (ref 89–?)
GLOBULIN SER-MCNC: 2.8 G/DL (ref 2.1–4.2)
GLUCOSE SERPL-MCNC: 97 MG/DL (ref 70–100)
HCT VFR BLD AUTO: 36.4 % (ref 42–52)
HGB UR QL STRIP: 12.6 G/DL (ref 14–18)
LYMPHOCYTES # SPEC AUTO: 2.2 10^3/UL (ref 1.5–3.5)
LYMPHOCYTES NFR BLD AUTO: 33 %
MAGNESIUM SERPL-MCNC: 1.7 MG/DL (ref 1.7–2.8)
MCH RBC QN AUTO: 34 PG (ref 27–31)
MCHC RBC AUTO-ENTMCNC: 34.6 G/DL (ref 32–36)
MCV RBC AUTO: 98.2 FL (ref 80–94)
MONOCYTES # BLD AUTO: 0.7 10^3/UL (ref 0–1)
MONOCYTES NFR BLD AUTO: 10.7 %
NEUTROPHILS # BLD AUTO: 3.4 10^3/UL (ref 1.5–6.6)
NEUTROPHILS # SNV AUTO: 6.6 X10^3/UL (ref 4.8–10.8)
NEUTROPHILS NFR BLD AUTO: 50.8 %
NRBC # BLD AUTO: 0.1 /100WBC
PDW BLD AUTO: 8.6 FL (ref 7.4–11.4)
POTASSIUM SERPL-SCNC: 3.5 MMOL/L (ref 3.5–5)
PROT SPEC-MCNC: 6.2 G/DL (ref 6.7–8.2)
RBC MAR: 3.71 10^6/UL (ref 4.7–6.1)
SODIUM SERPLBLD-SCNC: 140 MMOL/L (ref 135–145)
WBC # BLD: 6.6 X10^3/UL

## 2017-05-14 RX ADMIN — Medication SCH MG: at 09:02

## 2017-05-14 RX ADMIN — SODIUM CHLORIDE, PRESERVATIVE FREE SCH ML: 5 INJECTION INTRAVENOUS at 21:52

## 2017-05-14 RX ADMIN — HEPARIN SODIUM SCH UNIT: 5000 INJECTION, SOLUTION INTRAVENOUS; SUBCUTANEOUS at 21:52

## 2017-05-14 RX ADMIN — NICOTINE SCH PATCH: 14 PATCH TRANSDERMAL at 09:02

## 2017-05-14 RX ADMIN — NYSTATIN SCH ML: 100000 SUSPENSION ORAL at 09:02

## 2017-05-14 RX ADMIN — Medication SCH TAB: at 09:02

## 2017-05-14 RX ADMIN — HEPARIN SODIUM SCH UNIT: 5000 INJECTION, SOLUTION INTRAVENOUS; SUBCUTANEOUS at 09:03

## 2017-05-14 RX ADMIN — SODIUM CHLORIDE, PRESERVATIVE FREE SCH ML: 5 INJECTION INTRAVENOUS at 13:23

## 2017-05-14 RX ADMIN — NYSTATIN SCH ML: 100000 SUSPENSION ORAL at 21:52

## 2017-05-14 RX ADMIN — NYSTATIN SCH ML: 100000 SUSPENSION ORAL at 16:37

## 2017-05-14 RX ADMIN — SODIUM CHLORIDE, PRESERVATIVE FREE SCH ML: 5 INJECTION INTRAVENOUS at 05:25

## 2017-05-14 RX ADMIN — NYSTATIN SCH ML: 100000 SUSPENSION ORAL at 12:31

## 2017-05-14 NOTE — PROVIDER PROGRESS NOTE
Subjective





- Prog Note Date


Prog Note Date: 05/13/17


Prog Note Time: 11:00





- Subjective


Pt reports feeling: Improved (late entry 5-14 1500 He says he feels better.. He 

is still slow at times more so with responses when he does not hear well)





Objective





- Vital Signs/Intake & Output


Reviewed Vital Signs: Yes


Vital Signs: 





 Vital Signs x48h











  Temp Pulse Resp BP Pulse Ox


 


 05/14/17 08:41  36.8 C  77  14  139/76 H  95











Intake & Output: 





 Intake & Output











 05/11/17 05/12/17 05/13/17 05/14/17





 23:59 23:59 23:59 23:59


 


Intake Total 3239 1199 700 920


 


Output Total 500 1350 400 300


 


Balance 2739 -151 300 620














- Objective


General Appearance: positive: No acute distress, Alert


Eyes Bilateral: positive: PERRL, EOMI


ENT: positive: ENT inspection nml, Pharynx nml, No signs of dehydration


Neck: positive: Thyroid nml, Trachea midline


Respiratory: positive: Chest non-tender, No respiratory distress, Breath sounds 

nml


Cardiovascular: positive: Regular rate & rhythm, No murmur


Abdomen: positive: Non-tender, Nml bowel sounds


Skin: positive: Color nml, Warm





- Lab Results


Fish Bones: 


 05/15/17 04:55





 05/15/17 04:55


Other Labs: 





 Lab Results x24hrs











  05/14/17 05/14/17 Range/Units





  05:37 05:37 


 


WBC   6.6  (4.8-10.8)  x10^3/uL


 


RBC   3.71 L  (4.70-6.10)  10^6/uL


 


Hgb   12.6 L  (14.0-18.0)  g/dL


 


Hct   36.4 L  (42.0-52.0)  %


 


MCV   98.2 H  (80.0-94.0)  fL


 


MCH   34.0 H  (27.0-31.0)  pg


 


MCHC   34.6  (32.0-36.0)  g/dL


 


RDW   13.7  (12.0-15.0)  %


 


Plt Count   323  (130-450)  10^3/uL


 


MPV   8.6  (7.4-11.4)  fL


 


Neut #   3.4  (1.5-6.6)  10^3/uL


 


Lymph #   2.2  (1.5-3.5)  10^3/uL


 


Mono #   0.7  (0.0-1.0)  10^3/uL


 


Eos #   0.2  (0.0-0.7)  10^3/uL


 


Baso #   0.2 H  (0.0-0.1)  10^3/uL


 


Absolute Nucleated RBC   0.00  x10^3/uL


 


Nucleated RBCs   0.1  /100WBC


 


Sodium  140   (135-145)  mmol/L


 


Potassium  3.5   (3.5-5.0)  mmol/L


 


Chloride  105   (101-111)  mmol/L


 


Carbon Dioxide  28   (21-32)  mmol/L


 


Anion Gap  7.0   (6-13)  


 


BUN  10   (6-20)  mg/dL


 


Creatinine  0.6   (0.6-1.2)  mg/dL


 


Estimated GFR (MDRD)  140   (>89)  


 


Glucose  97   ()  mg/dL


 


Calcium  9.0   (8.5-10.3)  mg/dL


 


Magnesium  1.7   (1.7-2.8)  mg/dL


 


Total Bilirubin  0.6   (0.2-1.0)  mg/dL


 


AST  26   (10-42)  IU/L


 


ALT  22   (10-60)  IU/L


 


Alkaline Phosphatase  57   ()  IU/L


 


Total Protein  6.2 L   (6.7-8.2)  g/dL


 


Albumin  3.4   (3.2-5.5)  g/dL


 


Globulin  2.8   (2.1-4.2)  g/dL


 


Albumin/Globulin Ratio  1.2   (1.0-2.2)  














Assessment/Plan





- Problem List


(1) Alcohol withdrawal delirium, acute, mixed level of activity


Impression: 


Jose R is improving slowly.


He is thinking more clearly


His responses are still slow. He is hard of hearing


His balance and gait are quite impaired.


PT is working with him.


Will continue with prsent program and move to a Alcohol rehab 


placement.

## 2017-05-15 LAB
ALBUMIN/GLOB SERPL: 1.3 {RATIO} (ref 1–2.2)
ANION GAP SERPL CALCULATED.4IONS-SCNC: 8 MMOL/L (ref 6–13)
BASOPHILS NFR BLD AUTO: 0.2 10^3/UL (ref 0–0.1)
BASOPHILS NFR BLD AUTO: 2.6 %
BILIRUB BLD-MCNC: 0.3 MG/DL (ref 0.2–1)
BUN SERPL-MCNC: 9 MG/DL (ref 6–20)
CALCIUM UR-MCNC: 9.3 MG/DL (ref 8.5–10.3)
CHLORIDE SERPL-SCNC: 105 MMOL/L (ref 101–111)
CO2 SERPL-SCNC: 28 MMOL/L (ref 21–32)
CREAT SERPLBLD-SCNC: 0.7 MG/DL (ref 0.6–1.2)
CUL URINE ADD CHARGE: (no result)
EOSINOPHIL # BLD AUTO: 0.2 10^3/UL (ref 0–0.7)
EOSINOPHIL NFR BLD AUTO: 3.1 %
ERYTHROCYTE [DISTWIDTH] IN BLOOD BY AUTOMATED COUNT: 13.7 % (ref 12–15)
GFRSERPLBLD MDRD-ARVRAT: 117 ML/MIN/{1.73_M2} (ref 89–?)
GLOBULIN SER-MCNC: 2.8 G/DL (ref 2.1–4.2)
GLUCOSE SERPL-MCNC: 93 MG/DL (ref 70–100)
HCT VFR BLD AUTO: 38.1 % (ref 42–52)
HGB UR QL STRIP: 12.8 G/DL (ref 14–18)
LYMPHOCYTES # SPEC AUTO: 2.3 10^3/UL (ref 1.5–3.5)
LYMPHOCYTES NFR BLD AUTO: 37.5 %
MCH RBC QN AUTO: 33 PG (ref 27–31)
MCHC RBC AUTO-ENTMCNC: 33.6 G/DL (ref 32–36)
MCV RBC AUTO: 98.2 FL (ref 80–94)
MONOCYTES # BLD AUTO: 0.7 10^3/UL (ref 0–1)
MONOCYTES NFR BLD AUTO: 11.2 %
NEUTROPHILS # BLD AUTO: 2.8 10^3/UL (ref 1.5–6.6)
NEUTROPHILS # SNV AUTO: 6.2 X10^3/UL (ref 4.8–10.8)
NEUTROPHILS NFR BLD AUTO: 45.6 %
NRBC # BLD AUTO: 0 /100WBC
PDW BLD AUTO: 8.3 FL (ref 7.4–11.4)
PH UR STRIP.AUTO: 7.5 PH (ref 5–7.5)
POTASSIUM SERPL-SCNC: 3.9 MMOL/L (ref 3.5–5)
PROT SPEC-MCNC: 6.3 G/DL (ref 6.7–8.2)
RBC MAR: 3.88 10^6/UL (ref 4.7–6.1)
SODIUM SERPLBLD-SCNC: 141 MMOL/L (ref 135–145)
SP GR UR STRIP.AUTO: 1.01 (ref 1–1.03)
UA CHARGE (STRIP ONLY): YES
UA W/ MICROSCOPIC CHARGE: (no result)
UR CULTURE IF IND: (no result)
UROBILINOGEN UR STRIP.AUTO-MCNC: NEGATIVE MG/DL
WBC # BLD: 6.2 X10^3/UL

## 2017-05-15 RX ADMIN — Medication SCH MG: at 08:50

## 2017-05-15 RX ADMIN — NYSTATIN SCH ML: 100000 SUSPENSION ORAL at 08:50

## 2017-05-15 RX ADMIN — NYSTATIN SCH ML: 100000 SUSPENSION ORAL at 18:03

## 2017-05-15 RX ADMIN — NYSTATIN SCH: 100000 SUSPENSION ORAL at 21:03

## 2017-05-15 RX ADMIN — HEPARIN SODIUM SCH UNIT: 5000 INJECTION, SOLUTION INTRAVENOUS; SUBCUTANEOUS at 08:47

## 2017-05-15 RX ADMIN — SODIUM CHLORIDE, PRESERVATIVE FREE SCH: 5 INJECTION INTRAVENOUS at 08:08

## 2017-05-15 RX ADMIN — SODIUM CHLORIDE, PRESERVATIVE FREE SCH ML: 5 INJECTION INTRAVENOUS at 14:08

## 2017-05-15 RX ADMIN — NICOTINE SCH PATCH: 14 PATCH TRANSDERMAL at 08:49

## 2017-05-15 RX ADMIN — HEPARIN SODIUM SCH UNIT: 5000 INJECTION, SOLUTION INTRAVENOUS; SUBCUTANEOUS at 21:02

## 2017-05-15 RX ADMIN — SODIUM CHLORIDE, PRESERVATIVE FREE SCH ML: 5 INJECTION INTRAVENOUS at 21:03

## 2017-05-15 RX ADMIN — Medication SCH TAB: at 08:50

## 2017-05-15 RX ADMIN — NYSTATIN SCH ML: 100000 SUSPENSION ORAL at 14:08

## 2017-05-16 RX ADMIN — SODIUM CHLORIDE, PRESERVATIVE FREE SCH ML: 5 INJECTION INTRAVENOUS at 21:33

## 2017-05-16 RX ADMIN — HEPARIN SODIUM SCH UNIT: 5000 INJECTION, SOLUTION INTRAVENOUS; SUBCUTANEOUS at 08:49

## 2017-05-16 RX ADMIN — NYSTATIN SCH ML: 100000 SUSPENSION ORAL at 14:16

## 2017-05-16 RX ADMIN — NYSTATIN SCH: 100000 SUSPENSION ORAL at 18:56

## 2017-05-16 RX ADMIN — Medication SCH MG: at 08:46

## 2017-05-16 RX ADMIN — HEPARIN SODIUM SCH UNIT: 5000 INJECTION, SOLUTION INTRAVENOUS; SUBCUTANEOUS at 21:17

## 2017-05-16 RX ADMIN — SODIUM CHLORIDE, PRESERVATIVE FREE SCH ML: 5 INJECTION INTRAVENOUS at 06:28

## 2017-05-16 RX ADMIN — NYSTATIN SCH ML: 100000 SUSPENSION ORAL at 08:46

## 2017-05-16 RX ADMIN — Medication SCH TAB: at 08:46

## 2017-05-16 RX ADMIN — NYSTATIN SCH ML: 100000 SUSPENSION ORAL at 21:16

## 2017-05-16 RX ADMIN — SODIUM CHLORIDE, PRESERVATIVE FREE SCH ML: 5 INJECTION INTRAVENOUS at 14:16

## 2017-05-16 RX ADMIN — POLYETHYLENE GLYCOL 3350 SCH GM: 17 POWDER, FOR SOLUTION ORAL at 08:47

## 2017-05-16 RX ADMIN — NICOTINE SCH PATCH: 14 PATCH TRANSDERMAL at 08:46

## 2017-05-16 NOTE — PROVIDER PROGRESS NOTE
Assessment/Plan





- Problem List


(1) Alcohol withdrawal


Qualifiers: 


   Complication of substance-induced condition: with unspecified complication   

Qualified Code(s): F10.239 - Alcohol dependence with withdrawal, unspecified   


Assessment/Plan: 





Patient appears stable now after several days in ICU on ativan drip 


Patient now on CIWA but scores are low


Continue daily thiamine, MV and folic acid


Patient still very weak and unsteady on his feet 


Patient also is processing very slowly and requires repeated cueing 


Patient needs SNF placement 


Social work working on SNF but without any success so far patient being 

considered by several facilities 


Patient awaiting placement


Patient interested in alcohol rehab post SNF


Ativan prn








(2) Hypertension


Assessment/Plan: 


No history of hypertension


No home meds


Patient could have been hypertensive while hospitalized secondary to alcohol 

withdrawal


Patient on clonidine prn 


Will need follow up outpatient to see if he needs antihypertensives at home











(3) Prophylactic use of unfractionated heparin for venous thromboembolism (VTE)


Assessment/Plan: 


On subq heparin 








- Current Meds


Current Meds: 





 Current Medications











Generic Name Dose Route Start Last Admin





  Trade Name Freq  PRN Reason Stop Dose Admin


 


Heparin Sodium (Porcine)  5,000 unit 05/08/17 21:00 05/16/17 08:49





    SUBQ   5,000 unit





  BID DEBORAH   Administration


 


Lorazepam  2 mg 05/08/17 17:36 05/11/17 23:31





  Ativan Inj  IVP   2 mg





  Q30M PRN   Administration





  CIWA>8   





  Protocol   


 


Nicotine  1 patch 05/09/17 09:00 05/16/17 08:46





  Nicoderm  TOP   1 patch





  DAILY DEBORAH   Administration


 


Nystatin  5 ml 05/11/17 12:00 05/16/17 08:46





  Mycostatin  PO   5 ml





  QID DEBORAH   Administration


 


Polyethylene Glycol  17 gm 05/16/17 09:00 05/16/17 08:47





  Miralax  PO   17 gm





  DAILY DEBORAH   Administration


 


Prenatal Multivit/Folic Acid/Iron  1 tab 05/09/17 08:00 05/16/17 08:46





  Trinatal Rx 1  PO   1 tab





  DAILYWM DEBORAH   Administration


 


Sodium Chloride  10 ml 05/08/17 17:27 05/12/17 10:00





  Normal Saline Flush 0.9%  IVP   10 ml





  PRN PRN   Administration





  AS NEEDED PER PROVIDER ORDERS   


 


Sodium Chloride  10 ml 05/08/17 22:00 05/16/17 06:28





  Normal Saline Flush 0.9%  IVP   10 ml





  Q8HR DEBORAH   Administration


 


Thiamine HCl  100 mg 05/09/17 09:00 05/16/17 08:46





  Vitamin B-1  PO   100 mg





  DAILY DEBORAH   Administration














- Lab Result


Lab results reviewed: Yes


Fish Bone Diagrams: 


 05/15/17 04:55





 05/15/17 04:55





- EKG Results


EKG Interpreted Independently: Yes





- Diagnostic Imaging Results


Diagnostic Imaging Results: positive: Final report reviewed





- Additional Planning


Condition/Complexity: Stable


My Orders: 





My Active Orders





05/16/17 09:00


Polyethylene Glycol 3350 [Miralax]   17 gm PO DAILY 











Consult/Specialty: OT, PT


Plan Discussed with:: Patient


Time Spent: 15-30 minutes





Subjective





- Subjective


Patient Reports: Feeling Better (Patient feels well with no complaints. He is 

hard of hearing and is slow to process and answer questions. Denies any chest 

pain, shortness of breath or cough.)


Nursing Reports: No Complaints





Objective


Vital Signs: 





 Vital Signs - 24 hr











  05/15/17 05/16/17 05/16/17





  17:00 00:23 08:14


 


Temperature 36.3 C L 36.6 C 36.8 C


 


Heart Rate [ 77 73 73





Brachial]   


 


Respiratory 12 18 19





Rate   


 


Blood Pressure 151/88 H 124/73 153/85 H





[Left Brachial   





artery]   


 


O2 Saturation 98 95 94








 Oxygen











O2 Source                      Room air














I&O (Last 24 Hrs): 





 Intake and Output Totals x24h











 05/14/17 05/15/17 05/16/17





 23:59 23:59 23:59


 


Intake Total 1470 1450 360


 


Output Total 300 640 750


 


Balance 1170 810 -390











General: Alert, Cooperative, No acute distress


HEENT: Atraumatic, PERRLA, EOMI, Mucous membr. moist/pink


Neck: Supple, No JVD, No thyromegaly, +2 carotid pulse wo bruit, No LAD


Lymphatic: no adenopathy


Neuro: Alert, Non Focal, CN 2-12 Grossly Intact, Other (Slow to answer questions

, appears to be slow to process. Unsteady gait)


Cardiovascular: Regular rate, Normal S1, Normal S2, No murmurs


Respiratory: Chest non-tender, No respiratory distress, Breath sounds nml


Abdomen: Normal bowel sounds, Soft, No tenderness, No hepatospenomegaly


Extremities: No clubbing, No cyanosis, No edema, Normal pulses, No tenderness/

swelling


Skin: No rashes, No breakdown





- Results


Results: 





 Laboratory Results











WBC  6.2 x10^3/uL (4.8-10.8)   05/15/17  04:55    


 


RBC  3.88 10^6/uL (4.70-6.10)  L  05/15/17  04:55    


 


Hgb  12.8 g/dL (14.0-18.0)  L  05/15/17  04:55    


 


Hct  38.1 % (42.0-52.0)  L  05/15/17  04:55    


 


MCV  98.2 fL (80.0-94.0)  H  05/15/17  04:55    


 


MCH  33.0 pg (27.0-31.0)  H  05/15/17  04:55    


 


MCHC  33.6 g/dL (32.0-36.0)   05/15/17  04:55    


 


RDW  13.7 % (12.0-15.0)   05/15/17  04:55    


 


Plt Count  350 10^3/uL (130-450)   05/15/17  04:55    


 


MPV  8.3 fL (7.4-11.4)   05/15/17  04:55    


 


Neut #  2.8 10^3/uL (1.5-6.6)   05/15/17  04:55    


 


Lymph #  2.3 10^3/uL (1.5-3.5)   05/15/17  04:55    


 


Mono #  0.7 10^3/uL (0.0-1.0)   05/15/17  04:55    


 


Eos #  0.2 10^3/uL (0.0-0.7)   05/15/17  04:55    


 


Baso #  0.2 10^3/uL (0.0-0.1)  H  05/15/17  04:55    


 


Absolute Nucleated RBC  0.00 x10^3/uL  05/15/17  04:55    


 


Nucleated RBCs  0.0 /100WBC  05/15/17  04:55    


 


PT  12.7 secs (9.9-12.6)  H  05/09/17  04:45    


 


INR  1.1  (0.8-1.2)   05/09/17  04:45    


 


Sodium  141 mmol/L (135-145)   05/15/17  04:55    


 


Potassium  3.9 mmol/L (3.5-5.0)   05/15/17  04:55    


 


Chloride  105 mmol/L (101-111)   05/15/17  04:55    


 


Carbon Dioxide  28 mmol/L (21-32)   05/15/17  04:55    


 


Anion Gap  8.0  (6-13)   05/15/17  04:55    


 


BUN  9 mg/dL (6-20)   05/15/17  04:55    


 


Creatinine  0.7 mg/dL (0.6-1.2)   05/15/17  04:55    


 


Estimated GFR (MDRD)  117  (>89)   05/15/17  04:55    


 


Glucose  93 mg/dL ()   05/15/17  04:55    


 


Calcium  9.3 mg/dL (8.5-10.3)   05/15/17  04:55    


 


Phosphorus  3.8 mg/dL (2.5-4.6)   05/12/17  04:53    


 


Magnesium  1.7 mg/dL (1.7-2.8)   05/14/17  05:37    


 


Total Bilirubin  0.3 mg/dL (0.2-1.0)   05/15/17  04:55    


 


AST  31 IU/L (10-42)   05/15/17  04:55    


 


ALT  27 IU/L (10-60)   05/15/17  04:55    


 


Alkaline Phosphatase  60 IU/L ()   05/15/17  04:55    


 


Total Protein  6.3 g/dL (6.7-8.2)  L  05/15/17  04:55    


 


Albumin  3.5 g/dL (3.2-5.5)   05/15/17  04:55    


 


Globulin  2.8 g/dL (2.1-4.2)   05/15/17  04:55    


 


Albumin/Globulin Ratio  1.3  (1.0-2.2)   05/15/17  04:55    


 


Lipase  45 U/L (22-51)   05/08/17  09:55    


 


Urine Color  DARK YELLOW   05/15/17  09:45    


 


Urine Clarity  CLEAR  (CLEAR)   05/15/17  09:45    


 


Urine pH  7.5 PH (5.0-7.5)   05/15/17  09:45    


 


Ur Specific Gravity  1.010  (1.002-1.030)   05/15/17  09:45    


 


Urine Protein  NEGATIVE mg/dL (NEGATIVE)   05/15/17  09:45    


 


Urine Glucose (UA)  NEGATIVE mg/dL (NEGATIVE)   05/15/17  09:45    


 


Urine Ketones  TRACE mg/dL (NEGATIVE)   05/15/17  09:45    


 


Urine Occult Blood  NEGATIVE  (NEGATIVE)   05/15/17  09:45    


 


Urine Nitrite  NEGATIVE  (NEGATIVE)   05/15/17  09:45    


 


Urine Bilirubin  NEGATIVE  (NEGATIVE)   05/15/17  09:45    


 


Urine Urobilinogen  0.2 (NORMAL) E.U./dL (NORMAL)   05/15/17  09:45    


 


Ur Leukocyte Esterase  NEGATIVE  (NEGATIVE)   05/15/17  09:45    


 


Ur Microscopic Review  NOT INDICATED   05/15/17  09:45    


 


Urine Culture Comments  NOT INDICATED   05/15/17  09:45    


 


Urine Opiates Screen  NEGATIVE  (NEGATIVE)   05/08/17  11:30    


 


Ur Oxycodone Screen  NEGATIVE  (NEGATIVE)   05/08/17  11:30    


 


Urine Methadone Screen  NEGATIVE  (NEGATIVE)   05/08/17  11:30    


 


Ur Propoxyphene Screen  NEGATIVE  (NEGATIVE)   05/08/17  11:30    


 


Ur Barbiturates Screen  NEGATIVE  (NEGATIVE)   05/08/17  11:30    


 


Ur Tricyclics Screen  NEGATIVE  (NEGATIVE)   05/08/17  11:30    


 


Ur Phencyclidine Scrn  NEGATIVE  (NEGATIVE)   05/08/17  11:30    


 


Ur Amphetamine Screen  NEGATIVE  (NEGATIVE)   05/08/17  11:30    


 


U Methamphetamines Scrn  NEGATIVE  (NEGATIVE)   05/08/17  11:30    


 


U Benzodiazepines Scrn  NEGATIVE  (NEGATIVE)   05/08/17  11:30    


 


Urine Cocaine Screen  NEGATIVE  (NEGATIVE)   05/08/17  11:30    


 


U Cannabinoids Screen  NEGATIVE  (NEGATIVE)   05/08/17  11:30    


 


Ethyl Alcohol  < 5.0 mg/dL  05/08/17  09:55

## 2017-05-17 RX ADMIN — NICOTINE SCH PATCH: 14 PATCH TRANSDERMAL at 08:45

## 2017-05-17 RX ADMIN — SODIUM CHLORIDE, PRESERVATIVE FREE SCH: 5 INJECTION INTRAVENOUS at 15:55

## 2017-05-17 RX ADMIN — HEPARIN SODIUM SCH UNIT: 5000 INJECTION, SOLUTION INTRAVENOUS; SUBCUTANEOUS at 08:46

## 2017-05-17 RX ADMIN — POLYETHYLENE GLYCOL 3350 SCH: 17 POWDER, FOR SOLUTION ORAL at 11:25

## 2017-05-17 RX ADMIN — SODIUM CHLORIDE, PRESERVATIVE FREE SCH ML: 5 INJECTION INTRAVENOUS at 20:58

## 2017-05-17 RX ADMIN — NYSTATIN SCH: 100000 SUSPENSION ORAL at 15:55

## 2017-05-17 RX ADMIN — SODIUM CHLORIDE, PRESERVATIVE FREE SCH ML: 5 INJECTION INTRAVENOUS at 06:38

## 2017-05-17 RX ADMIN — NYSTATIN SCH ML: 100000 SUSPENSION ORAL at 08:45

## 2017-05-17 RX ADMIN — HEPARIN SODIUM SCH UNIT: 5000 INJECTION, SOLUTION INTRAVENOUS; SUBCUTANEOUS at 20:58

## 2017-05-17 RX ADMIN — NYSTATIN SCH ML: 100000 SUSPENSION ORAL at 20:58

## 2017-05-17 RX ADMIN — Medication SCH TAB: at 08:46

## 2017-05-17 RX ADMIN — NYSTATIN SCH ML: 100000 SUSPENSION ORAL at 19:03

## 2017-05-17 RX ADMIN — Medication SCH MG: at 08:46

## 2017-05-17 NOTE — PROVIDER PROGRESS NOTE
Assessment/Plan





- Problem List


(1) Alcohol withdrawal


Qualifiers: 


   Complication of substance-induced condition: with unspecified complication   

Qualified Code(s): F10.239 - Alcohol dependence with withdrawal, unspecified   


Assessment/Plan: 





Patient appears stable now after several days in ICU on ativan drip 


Discontinue CIWA as patient not stable low to negative scores


Continue daily thiamine, MV and folic acid


Patient still very weak and unsteady on his feet but improving


Processing is improving and better able to follow cues


Patient needs SNF placement 


Social work working on SNF but without any success so far patient being 

considered by several facilities 


Patient awaiting placement


Patient interested in alcohol rehab post SNF


Ativan prn








(2) Hypertension


Assessment/Plan: 


No history of hypertension


No home meds


Patient could have been hypertensive while hospitalized secondary to alcohol 

withdrawal


Patient on clonidine prn 


Will need follow up outpatient to see if he needs antihypertensives at home


BP stable











(3) Prophylactic use of unfractionated heparin for venous thromboembolism (VTE)


Assessment/Plan: 


On subq heparin 











- Current Meds


Current Meds: 





 Current Medications











Generic Name Dose Route Start Last Admin





  Trade Name Freq  PRN Reason Stop Dose Admin


 


Heparin Sodium (Porcine)  5,000 unit 05/08/17 21:00 05/17/17 08:46





    SUBQ   5,000 unit





  BID DEBORAH   Administration


 


Lorazepam  2 mg 05/08/17 17:36 05/11/17 23:31





  Ativan Inj  IVP   2 mg





  Q30M PRN   Administration





  CIWA>8   





  Protocol   


 


Nicotine  1 patch 05/09/17 09:00 05/17/17 08:45





  Nicoderm  TOP   1 patch





  DAILY DEBORAH   Administration


 


Nystatin  5 ml 05/11/17 12:00 05/17/17 15:55





  Mycostatin  PO   Not Given





  QID DEBORAH   


 


Polyethylene Glycol  17 gm 05/16/17 09:00 05/17/17 11:25





  Miralax  PO   Not Given





  DAILY DEBORAH   


 


Prenatal Multivit/Folic Acid/Iron  1 tab 05/09/17 08:00 05/17/17 08:46





  Trinatal Rx 1  PO   1 tab





  DAILYWM DEBORAH   Administration


 


Sodium Chloride  10 ml 05/08/17 17:27 05/12/17 10:00





  Normal Saline Flush 0.9%  IVP   10 ml





  PRN PRN   Administration





  AS NEEDED PER PROVIDER ORDERS   


 


Sodium Chloride  10 ml 05/08/17 22:00 05/17/17 15:55





  Normal Saline Flush 0.9%  IVP   Not Given





  Q8HR DEBORAH   


 


Thiamine HCl  100 mg 05/09/17 09:00 05/17/17 08:46





  Vitamin B-1  PO   100 mg





  DAILY DEBORAH   Administration














- Lab Result


Lab results reviewed: Yes


Fish Bone Diagrams: 


 05/15/17 04:55





 05/15/17 04:55





- EKG Results


EKG Interpreted Independently: Yes





- Diagnostic Imaging Results


Diagnostic Imaging Results: positive: Final report reviewed





- Additional Planning


Condition/Complexity: Improved


Consult/Specialty: PT


Plan Discussed with:: Patient


Time Spent: 15-30 minutes





Subjective





- Subjective


Patient Reports: Feeling Better (Patient feeling well today. He is progressing 

well with walker. Denies any chest pain, shortness of breath, abdominal pain.)


Nursing Reports: No Complaints





Objective


Vital Signs: 





 Vital Signs - 24 hr











  05/17/17 05/17/17





  00:36 09:33


 


Temperature 36.8 C 36.7 C


 


Heart Rate [ 81 80





Brachial]  


 


Respiratory 16 16





Rate  


 


Blood Pressure 145/78 H 126/79





[Left Brachial  





artery]  


 


O2 Saturation 96 94








 Oxygen











O2 Source                      Room air














I&O (Last 24 Hrs): 





 Intake and Output Totals x24h











 05/15/17 05/16/17 05/17/17





 23:59 23:59 23:59


 


Intake Total 1450 1730 1250


 


Output Total 


 


Balance 810 980 225











General: Alert, Oriented x3, Cooperative, No acute distress


HEENT: Atraumatic, PERRLA, EOMI, Mucous membr. moist/pink


Neck: Supple, No JVD, No thyromegaly, +2 carotid pulse wo bruit, No LAD


Lymphatic: no adenopathy


Neuro: Alert, Non Focal, CN 2-12 Grossly Intact, Oriented Times 3


Cardiovascular: Regular rate, Normal S1, Normal S2, No murmurs


Respiratory: Chest non-tender, No respiratory distress, Breath sounds nml


Abdomen: Normal bowel sounds, Soft, No tenderness, No hepatospenomegaly, No 

masses


Extremities: No clubbing, No cyanosis, No edema, Normal pulses, No tenderness/

swelling


Skin: No rashes, No breakdown, No significant lesion





- Results


Results: 





 Laboratory Results











WBC  6.2 x10^3/uL (4.8-10.8)   05/15/17  04:55    


 


RBC  3.88 10^6/uL (4.70-6.10)  L  05/15/17  04:55    


 


Hgb  12.8 g/dL (14.0-18.0)  L  05/15/17  04:55    


 


Hct  38.1 % (42.0-52.0)  L  05/15/17  04:55    


 


MCV  98.2 fL (80.0-94.0)  H  05/15/17  04:55    


 


MCH  33.0 pg (27.0-31.0)  H  05/15/17  04:55    


 


MCHC  33.6 g/dL (32.0-36.0)   05/15/17  04:55    


 


RDW  13.7 % (12.0-15.0)   05/15/17  04:55    


 


Plt Count  350 10^3/uL (130-450)   05/15/17  04:55    


 


MPV  8.3 fL (7.4-11.4)   05/15/17  04:55    


 


Neut #  2.8 10^3/uL (1.5-6.6)   05/15/17  04:55    


 


Lymph #  2.3 10^3/uL (1.5-3.5)   05/15/17  04:55    


 


Mono #  0.7 10^3/uL (0.0-1.0)   05/15/17  04:55    


 


Eos #  0.2 10^3/uL (0.0-0.7)   05/15/17  04:55    


 


Baso #  0.2 10^3/uL (0.0-0.1)  H  05/15/17  04:55    


 


Absolute Nucleated RBC  0.00 x10^3/uL  05/15/17  04:55    


 


Nucleated RBCs  0.0 /100WBC  05/15/17  04:55    


 


PT  12.7 secs (9.9-12.6)  H  05/09/17  04:45    


 


INR  1.1  (0.8-1.2)   05/09/17  04:45    


 


Sodium  141 mmol/L (135-145)   05/15/17  04:55    


 


Potassium  3.9 mmol/L (3.5-5.0)   05/15/17  04:55    


 


Chloride  105 mmol/L (101-111)   05/15/17  04:55    


 


Carbon Dioxide  28 mmol/L (21-32)   05/15/17  04:55    


 


Anion Gap  8.0  (6-13)   05/15/17  04:55    


 


BUN  9 mg/dL (6-20)   05/15/17  04:55    


 


Creatinine  0.7 mg/dL (0.6-1.2)   05/15/17  04:55    


 


Estimated GFR (MDRD)  117  (>89)   05/15/17  04:55    


 


Glucose  93 mg/dL ()   05/15/17  04:55    


 


Calcium  9.3 mg/dL (8.5-10.3)   05/15/17  04:55    


 


Phosphorus  3.8 mg/dL (2.5-4.6)   05/12/17  04:53    


 


Magnesium  1.7 mg/dL (1.7-2.8)   05/14/17  05:37    


 


Total Bilirubin  0.3 mg/dL (0.2-1.0)   05/15/17  04:55    


 


AST  31 IU/L (10-42)   05/15/17  04:55    


 


ALT  27 IU/L (10-60)   05/15/17  04:55    


 


Alkaline Phosphatase  60 IU/L ()   05/15/17  04:55    


 


Total Protein  6.3 g/dL (6.7-8.2)  L  05/15/17  04:55    


 


Albumin  3.5 g/dL (3.2-5.5)   05/15/17  04:55    


 


Globulin  2.8 g/dL (2.1-4.2)   05/15/17  04:55    


 


Albumin/Globulin Ratio  1.3  (1.0-2.2)   05/15/17  04:55    


 


Lipase  45 U/L (22-51)   05/08/17  09:55    


 


Urine Color  DARK YELLOW   05/15/17  09:45    


 


Urine Clarity  CLEAR  (CLEAR)   05/15/17  09:45    


 


Urine pH  7.5 PH (5.0-7.5)   05/15/17  09:45    


 


Ur Specific Gravity  1.010  (1.002-1.030)   05/15/17  09:45    


 


Urine Protein  NEGATIVE mg/dL (NEGATIVE)   05/15/17  09:45    


 


Urine Glucose (UA)  NEGATIVE mg/dL (NEGATIVE)   05/15/17  09:45    


 


Urine Ketones  TRACE mg/dL (NEGATIVE)   05/15/17  09:45    


 


Urine Occult Blood  NEGATIVE  (NEGATIVE)   05/15/17  09:45    


 


Urine Nitrite  NEGATIVE  (NEGATIVE)   05/15/17  09:45    


 


Urine Bilirubin  NEGATIVE  (NEGATIVE)   05/15/17  09:45    


 


Urine Urobilinogen  0.2 (NORMAL) E.U./dL (NORMAL)   05/15/17  09:45    


 


Ur Leukocyte Esterase  NEGATIVE  (NEGATIVE)   05/15/17  09:45    


 


Ur Microscopic Review  NOT INDICATED   05/15/17  09:45    


 


Urine Culture Comments  NOT INDICATED   05/15/17  09:45    


 


Urine Opiates Screen  NEGATIVE  (NEGATIVE)   05/08/17  11:30    


 


Ur Oxycodone Screen  NEGATIVE  (NEGATIVE)   05/08/17  11:30    


 


Urine Methadone Screen  NEGATIVE  (NEGATIVE)   05/08/17  11:30    


 


Ur Propoxyphene Screen  NEGATIVE  (NEGATIVE)   05/08/17  11:30    


 


Ur Barbiturates Screen  NEGATIVE  (NEGATIVE)   05/08/17  11:30    


 


Ur Tricyclics Screen  NEGATIVE  (NEGATIVE)   05/08/17  11:30    


 


Ur Phencyclidine Scrn  NEGATIVE  (NEGATIVE)   05/08/17  11:30    


 


Ur Amphetamine Screen  NEGATIVE  (NEGATIVE)   05/08/17  11:30    


 


U Methamphetamines Scrn  NEGATIVE  (NEGATIVE)   05/08/17  11:30    


 


U Benzodiazepines Scrn  NEGATIVE  (NEGATIVE)   05/08/17  11:30    


 


Urine Cocaine Screen  NEGATIVE  (NEGATIVE)   05/08/17  11:30    


 


U Cannabinoids Screen  NEGATIVE  (NEGATIVE)   05/08/17  11:30    


 


Ethyl Alcohol  < 5.0 mg/dL  05/08/17  09:55

## 2017-05-18 VITALS — SYSTOLIC BLOOD PRESSURE: 114 MMHG | DIASTOLIC BLOOD PRESSURE: 68 MMHG

## 2017-05-18 RX ADMIN — NYSTATIN SCH ML: 100000 SUSPENSION ORAL at 17:36

## 2017-05-18 RX ADMIN — SODIUM CHLORIDE, PRESERVATIVE FREE SCH ML: 5 INJECTION INTRAVENOUS at 06:30

## 2017-05-18 RX ADMIN — NYSTATIN SCH ML: 100000 SUSPENSION ORAL at 21:08

## 2017-05-18 RX ADMIN — NYSTATIN SCH ML: 100000 SUSPENSION ORAL at 13:26

## 2017-05-18 RX ADMIN — Medication SCH TAB: at 08:21

## 2017-05-18 RX ADMIN — NICOTINE SCH PATCH: 14 PATCH TRANSDERMAL at 08:21

## 2017-05-18 RX ADMIN — SODIUM CHLORIDE, PRESERVATIVE FREE SCH ML: 5 INJECTION INTRAVENOUS at 21:08

## 2017-05-18 RX ADMIN — HEPARIN SODIUM SCH UNIT: 5000 INJECTION, SOLUTION INTRAVENOUS; SUBCUTANEOUS at 21:08

## 2017-05-18 RX ADMIN — HEPARIN SODIUM SCH: 5000 INJECTION, SOLUTION INTRAVENOUS; SUBCUTANEOUS at 08:26

## 2017-05-18 RX ADMIN — SODIUM CHLORIDE, PRESERVATIVE FREE SCH ML: 5 INJECTION INTRAVENOUS at 13:26

## 2017-05-18 RX ADMIN — Medication SCH MG: at 08:21

## 2017-05-18 RX ADMIN — POLYETHYLENE GLYCOL 3350 SCH GM: 17 POWDER, FOR SOLUTION ORAL at 08:21

## 2017-05-18 RX ADMIN — NYSTATIN SCH ML: 100000 SUSPENSION ORAL at 08:21

## 2017-05-18 NOTE — PROVIDER PROGRESS NOTE
Assessment/Plan





- Problem List


(1) Alcohol withdrawal


Qualifiers: 


   Complication of substance-induced condition: with unspecified complication   

Qualified Code(s): F10.239 - Alcohol dependence with withdrawal, unspecified   


Assessment/Plan: 


Patient appears stable now after several days in ICU on ativan drip 


Discontinue CIWA as patient not stable low to negative scores


Continue daily thiamine, MV and folic acid


Patient is much improved today 


He is able to ambulate with little assistance and steady on his feet


PT feel he is strong enough now that he no longer needs subacute rehab


Patient would like to go the alcohol rehab and social work will try to set up a 

discharge directly to alcohol rehab for tomorrow


Patient is also processing much better and follow all cues without any problems


Ativan prn








(2) Hypertension


Assessment/Plan: 


No history of hypertension


No home meds


Patient could have been hypertensive while hospitalized secondary to alcohol 

withdrawal


Patient on clonidine prn 


Will need follow up outpatient to see if he needs antihypertensives at home


BP stable











(3) Prophylactic use of unfractionated heparin for venous thromboembolism (VTE)


Assessment/Plan: 


On subq heparin 











- Current Meds


Current Meds: 





 Current Medications











Generic Name Dose Route Start Last Admin





  Trade Name Freq  PRN Reason Stop Dose Admin


 


Heparin Sodium (Porcine)  5,000 unit 05/08/17 21:00 05/18/17 08:26





    SUBQ   Not Given





  BID DEBORAH   


 


Lorazepam  2 mg 05/08/17 17:36 05/11/17 23:31





  Ativan Inj  IVP   2 mg





  Q30M PRN   Administration





  CIWA>8   





  Protocol   


 


Nicotine  1 patch 05/09/17 09:00 05/18/17 08:21





  Nicoderm  TOP   1 patch





  DAILY DEBORAH   Administration


 


Nystatin  5 ml 05/11/17 12:00 05/18/17 08:21





  Mycostatin  PO   5 ml





  QID DEBORAH   Administration


 


Polyethylene Glycol  17 gm 05/16/17 09:00 05/18/17 08:21





  Miralax  PO   17 gm





  DAILY DEBORAH   Administration


 


Prenatal Multivit/Folic Acid/Iron  1 tab 05/09/17 08:00 05/18/17 08:21





  Trinatal Rx 1  PO   1 tab





  DAILYWM DEBORAH   Administration


 


Sodium Chloride  10 ml 05/08/17 17:27 05/12/17 10:00





  Normal Saline Flush 0.9%  IVP   10 ml





  PRN PRN   Administration





  AS NEEDED PER PROVIDER ORDERS   


 


Sodium Chloride  10 ml 05/08/17 22:00 05/18/17 06:30





  Normal Saline Flush 0.9%  IVP   10 ml





  Q8HR DEBORAH   Administration


 


Thiamine HCl  100 mg 05/09/17 09:00 05/18/17 08:21





  Vitamin B-1  PO   100 mg





  DAILY DEBORAH   Administration














- Lab Result


Lab results reviewed: Yes


Fish Bone Diagrams: 


 05/15/17 04:55





 05/15/17 04:55





- EKG Results


EKG Interpreted Independently: Yes





- Diagnostic Imaging Results


Diagnostic Imaging Results: positive: Final report reviewed





- Additional Planning


Condition/Complexity: Stable


Consult/Specialty: PT


Plan Discussed with:: Patient, Spouse





Subjective





- Subjective


Patient Reports: Feeling Better (Patient feels well. He was walking the halls. 

He is commited to quitting drinking. He denies any nausea, vomiting or chest 

pain.)


Nursing Reports: No Complaints





Objective


Vital Signs: 





 Vital Signs - 24 hr











  05/17/17 05/17/17 05/18/17





  17:10 23:35 07:53


 


Temperature 37.1 C 36.5 C 36.6 C


 


Heart Rate [ 80 80 73





Brachial]   


 


Respiratory 16 16 14





Rate   


 


Blood Pressure 131/79 H  136/73 H





[Left Brachial   





artery]   


 


Blood Pressure  130/66 





[Right Brachial   





artery]   


 


O2 Saturation 97 96 98








 Oxygen











O2 Source                      Room air














I&O (Last 24 Hrs): 





 Intake and Output Totals x24h











 05/16/17 05/17/17 05/18/17





 23:59 23:59 23:59


 


Intake Total 1730 1790 750


 


Output Total 750 1025 


 


Balance 980 765 750











General: Alert, Oriented x3, Cooperative, No acute distress


HEENT: Atraumatic, PERRLA, EOMI, Mucous membr. moist/pink


Neck: Supple, No JVD, No thyromegaly, +2 carotid pulse wo bruit, No LAD


Lymphatic: no adenopathy


Neuro: Alert, Non Focal, CN 2-12 Grossly Intact, Oriented Times 3


Cardiovascular: Regular rate, Normal S1, Normal S2, No murmurs


Respiratory: Chest non-tender, No respiratory distress, Breath sounds nml


Abdomen: Normal bowel sounds, Soft, No tenderness, No hepatospenomegaly


Rectal: Non-Tender


Extremities: No clubbing, No cyanosis, No edema, Normal pulses


Skin: No rashes, No breakdown, No significant lesion





- Results


Results: 





 Laboratory Results











WBC  6.2 x10^3/uL (4.8-10.8)   05/15/17  04:55    


 


RBC  3.88 10^6/uL (4.70-6.10)  L  05/15/17  04:55    


 


Hgb  12.8 g/dL (14.0-18.0)  L  05/15/17  04:55    


 


Hct  38.1 % (42.0-52.0)  L  05/15/17  04:55    


 


MCV  98.2 fL (80.0-94.0)  H  05/15/17  04:55    


 


MCH  33.0 pg (27.0-31.0)  H  05/15/17  04:55    


 


MCHC  33.6 g/dL (32.0-36.0)   05/15/17  04:55    


 


RDW  13.7 % (12.0-15.0)   05/15/17  04:55    


 


Plt Count  350 10^3/uL (130-450)   05/15/17  04:55    


 


MPV  8.3 fL (7.4-11.4)   05/15/17  04:55    


 


Neut #  2.8 10^3/uL (1.5-6.6)   05/15/17  04:55    


 


Lymph #  2.3 10^3/uL (1.5-3.5)   05/15/17  04:55    


 


Mono #  0.7 10^3/uL (0.0-1.0)   05/15/17  04:55    


 


Eos #  0.2 10^3/uL (0.0-0.7)   05/15/17  04:55    


 


Baso #  0.2 10^3/uL (0.0-0.1)  H  05/15/17  04:55    


 


Absolute Nucleated RBC  0.00 x10^3/uL  05/15/17  04:55    


 


Nucleated RBCs  0.0 /100WBC  05/15/17  04:55    


 


PT  12.7 secs (9.9-12.6)  H  05/09/17  04:45    


 


INR  1.1  (0.8-1.2)   05/09/17  04:45    


 


Sodium  141 mmol/L (135-145)   05/15/17  04:55    


 


Potassium  3.9 mmol/L (3.5-5.0)   05/15/17  04:55    


 


Chloride  105 mmol/L (101-111)   05/15/17  04:55    


 


Carbon Dioxide  28 mmol/L (21-32)   05/15/17  04:55    


 


Anion Gap  8.0  (6-13)   05/15/17  04:55    


 


BUN  9 mg/dL (6-20)   05/15/17  04:55    


 


Creatinine  0.7 mg/dL (0.6-1.2)   05/15/17  04:55    


 


Estimated GFR (MDRD)  117  (>89)   05/15/17  04:55    


 


Glucose  93 mg/dL ()   05/15/17  04:55    


 


Calcium  9.3 mg/dL (8.5-10.3)   05/15/17  04:55    


 


Phosphorus  3.8 mg/dL (2.5-4.6)   05/12/17  04:53    


 


Magnesium  1.7 mg/dL (1.7-2.8)   05/14/17  05:37    


 


Total Bilirubin  0.3 mg/dL (0.2-1.0)   05/15/17  04:55    


 


AST  31 IU/L (10-42)   05/15/17  04:55    


 


ALT  27 IU/L (10-60)   05/15/17  04:55    


 


Alkaline Phosphatase  60 IU/L ()   05/15/17  04:55    


 


Total Protein  6.3 g/dL (6.7-8.2)  L  05/15/17  04:55    


 


Albumin  3.5 g/dL (3.2-5.5)   05/15/17  04:55    


 


Globulin  2.8 g/dL (2.1-4.2)   05/15/17  04:55    


 


Albumin/Globulin Ratio  1.3  (1.0-2.2)   05/15/17  04:55    


 


Lipase  45 U/L (22-51)   05/08/17  09:55    


 


Urine Color  DARK YELLOW   05/15/17  09:45    


 


Urine Clarity  CLEAR  (CLEAR)   05/15/17  09:45    


 


Urine pH  7.5 PH (5.0-7.5)   05/15/17  09:45    


 


Ur Specific Gravity  1.010  (1.002-1.030)   05/15/17  09:45    


 


Urine Protein  NEGATIVE mg/dL (NEGATIVE)   05/15/17  09:45    


 


Urine Glucose (UA)  NEGATIVE mg/dL (NEGATIVE)   05/15/17  09:45    


 


Urine Ketones  TRACE mg/dL (NEGATIVE)   05/15/17  09:45    


 


Urine Occult Blood  NEGATIVE  (NEGATIVE)   05/15/17  09:45    


 


Urine Nitrite  NEGATIVE  (NEGATIVE)   05/15/17  09:45    


 


Urine Bilirubin  NEGATIVE  (NEGATIVE)   05/15/17  09:45    


 


Urine Urobilinogen  0.2 (NORMAL) E.U./dL (NORMAL)   05/15/17  09:45    


 


Ur Leukocyte Esterase  NEGATIVE  (NEGATIVE)   05/15/17  09:45    


 


Ur Microscopic Review  NOT INDICATED   05/15/17  09:45    


 


Urine Culture Comments  NOT INDICATED   05/15/17  09:45    


 


Urine Opiates Screen  NEGATIVE  (NEGATIVE)   05/08/17  11:30    


 


Ur Oxycodone Screen  NEGATIVE  (NEGATIVE)   05/08/17  11:30    


 


Urine Methadone Screen  NEGATIVE  (NEGATIVE)   05/08/17  11:30    


 


Ur Propoxyphene Screen  NEGATIVE  (NEGATIVE)   05/08/17  11:30    


 


Ur Barbiturates Screen  NEGATIVE  (NEGATIVE)   05/08/17  11:30    


 


Ur Tricyclics Screen  NEGATIVE  (NEGATIVE)   05/08/17  11:30    


 


Ur Phencyclidine Scrn  NEGATIVE  (NEGATIVE)   05/08/17  11:30    


 


Ur Amphetamine Screen  NEGATIVE  (NEGATIVE)   05/08/17  11:30    


 


U Methamphetamines Scrn  NEGATIVE  (NEGATIVE)   05/08/17  11:30    


 


U Benzodiazepines Scrn  NEGATIVE  (NEGATIVE)   05/08/17  11:30    


 


Urine Cocaine Screen  NEGATIVE  (NEGATIVE)   05/08/17  11:30    


 


U Cannabinoids Screen  NEGATIVE  (NEGATIVE)   05/08/17  11:30    


 


Ethyl Alcohol  < 5.0 mg/dL  05/08/17  09:55

## 2017-05-19 RX ADMIN — POLYETHYLENE GLYCOL 3350 SCH: 17 POWDER, FOR SOLUTION ORAL at 07:37

## 2017-05-19 RX ADMIN — NYSTATIN SCH: 100000 SUSPENSION ORAL at 07:38

## 2017-05-19 RX ADMIN — Medication SCH TAB: at 07:37

## 2017-05-19 RX ADMIN — SODIUM CHLORIDE, PRESERVATIVE FREE SCH ML: 5 INJECTION INTRAVENOUS at 05:33

## 2017-05-19 RX ADMIN — Medication SCH MG: at 07:37

## 2017-05-19 RX ADMIN — HEPARIN SODIUM SCH: 5000 INJECTION, SOLUTION INTRAVENOUS; SUBCUTANEOUS at 07:38

## 2017-05-19 RX ADMIN — NICOTINE SCH: 14 PATCH TRANSDERMAL at 07:38

## 2017-05-19 NOTE — DISCHARGE SUMMARY
DATE OF ADMISSION: 05/08/2017

 

DATE OF DISCHARGE: 05/19/2017

 

DISCHARGING PHYSICIAN: Thiago Bourgeois MD.

 

PRIMARY CARE PROVIDER: None.  

 

DISCHARGE DIAGNOSES:

1. Alcohol withdrawal.

2. Hypertension.

3. Prophylactic use of unfractionated heparin for venous thromboembolism.

 

DISCHARGE MEDICATIONS:

1. Thiamine 100 mg p.o. daily.

2. Prenatal vitamin 1 tablet p.o. daily.

 

HOSPITAL COURSE: The patient is a 56-year-old gentleman with a past medical history of both alcohol a
nd tobacco abuse since his 20s. The patient reported drinking a case of beer per day and had stopped 
over the week prior to presentation to the hospital. The patient's wife had asked the patient to cut 
down on his drinking as there was company coming to see them. He stopped drinking completely about 4 
days prior to presenting to the hospital. He had fallen and struck his rib cage on a desk and fractur
ed his seventh rib. He tried to take some old Vicodin, but had a bad reaction. The patient then tried
 over-the-counter ibuprofen and an over-the-counter sleeping pill. He became very confused the next d
ay and stopped those medications as well. Since then, the patient had become progressively more confu
sed. He began hallucinating about people on a different plane who were trying to kill him, and also a
bout dangerous people and animals in their yard. On evaluation in the emergency department, the kisha schofield was in acute alcohol withdrawal. He was very agitated, delusional and attempt was made to transfer
 him to a psychiatric facility or an alcohol rehab facility; however, this attempt failed. The james thompson was admitted to the hospital in active delirium tremens. He was admitted to the ICU for close monit
oring. He was very confused and actively pulling out lines and climbing out of the bed. Therefore, he
 had to be placed in restraints. In the ICU, he had to be placed on an Ativan drip. He remained in 
e ICU for several days and was requiring as much as 18 mg of IV Ativan per hour. The patient finally 
after several days in the ICU, was able to be weaned off the Ativan and initially was still quite con
fused and slow to process, but eventually regained his processing and he seemed to clear up. The leandra
ent also had significant weakness from the long ICU stay and required Physical Therapy, who deemed in
itially that the patient needed subacute rehabilitation. The patient was kept in the hospital as they
 could not find a rehab facility due to insurance issues. The patient became strong enough that he wa
s well enough to discharge home; however, the patient then wanted to go into an inpatient alcohol nicolette
ab program. This was set up for him at [facility] on 05/19/2017. The patient was discharge
d earlier that morning for intake at 1 p.m. on 05/19/2017 at [facility]. The leandra
ent seemed very committed to quitting drinking and had a very positive attitude. He was very thankful
 of all the care that was provided to him in the hospital, and he was in a very stable condition at t
he time of discharge, having recovered from his alcohol withdrawal, having regained his strength and 
having regained clarity of his mind. The patient had very good cognition. He is still quite hard of h
earing, but otherwise was doing very well at the time of discharge. The patient did not have any prio
r medical problems and was only discharged on a multivitamin and thiamine. He was hypertensive during
 the hospitalization at many times, but this was felt likely to be due to his alcohol withdrawal. By 
the date of discharge, the patient's blood pressure was well controlled without any medications. He w
as put on p.r.n. clonidine while he was hospitalized, but will need to followup with his PCP to see i
f he needs any further blood pressure medications.

 

PHYSICAL EXAMINATION ON DISCHARGE:

VITAL SIGNS: Temperature 36.8, heart rate 71, blood pressure 114/68, respiratory rate 17, O2 saturati
on 97% on room air.

GENERAL: The patient does not appear to be in any acute distress. He is resting comfortably in the be
d. He is alert and able to answer all my questions appropriately. He is hard of hearing.

HEENT: Pupils are equal and reactive to light. Extraocular muscles are intact. Mucous membranes are m
oist. There is no conjunctival pallor or scleral icterus noted.

NECK: Supple. No thyromegaly. No JVD. Trachea is midline.

LYMPH NODES: There is no cervical or axillary lymphadenopathy noted.

CARDIOVASCULAR: S1, S2, regular rate and rhythm. No murmurs, rubs, or gallops.

LUNGS: Clear to auscultation bilaterally. No wheezes, rhonchi, or crackles.

ABDOMEN: Soft, nontender, nondistended. Bowel sounds are present in all 4 quadrants.

EXTREMITIES: There is no lower extremity edema. Peripheral pulses are palpable. There is no cyanosis 
or clubbing. 

SKIN: No skin rash, lesions, cellulitis or abscesses.

MUSCULOSKELETAL: The patient has good range of motion. No joint tenderness. No joint effusions.

NEUROLOGIC: The patient is alert and oriented x3. Cranial nerves 2-12 grossly intact. Strength is suresh
ssly normal. Sensations are intact.

 

LABORATORY: WBC is 6.2, hemoglobin 12.8, hematocrit 38.1, platelet count 350, INR 1.1. Sodium 141, po
tassium 3.9, chloride 105, carbon dioxide 28, BUN 9, creatinine 0.7, glucose 193, calcium 9.3, phosph
orus 3.8, magnesium 1.7, total bilirubin 0.3, AST 31, ALT 12, AST 27, alkaline phosphatase 60, total 
protein 6.3, albumin 3.5. 

 

Urine tox screen negative. Alcohol less than 5.

 

IMAGING:

CT head 05/08/2017, IMPRESSION:

1. Brain parenchyma within normal limits, no or mass or hemorrhage.

2. Left frontal and to a lesser degree left ethmoid and maxillary chronic sinusitis. 

 

FOLLOWUP/RECOMMENDATIONS: The patient is admitted for alcohol withdrawal. He had a long hospitalizati
on including ICU stay, requiring IV Ativan drip, but now has been able to be weaned off of Ativan com
pletely. No longer requiring CIWA checks. He is completely out of alcohol withdrawal. Initially was v
marion weak. He has recovered with physical therapy at the hospital and was well enough to be discharged
 home. The patient, however, preferred to be discharged to an inpatient alcohol rehab and he was disc
harged to the alcohol rehab by private vehicle, his wife will take him there today. The patient will 
followup with his primary care physician once he has gone through rehabilitation.

 

TIME SPENT ON DISCHARGE: Greater than 30 minutes were spent on discharge.

 

 

 

DD:05/19/2017 15:20:00  DT: 05/19/2017 19:01  JOB #: [id number]  EXT JOB #:[id number]

## 2017-05-19 NOTE — DISCHARGE PLAN
Discharge Plan


Disposition: 01 Home, Self Care


Condition: Fair


Prescriptions: 


Prenatal Vitamin [Trinatal Rx 1] 1 tab PO DAILYWM #30 tablet


Thiamine [Vitamin B-1] 100 mg PO DAILY #30 tablet


Diet: Regular


Activity Restrictions: Activity as Tolerated


Shower Restrictions: No


Driving Restrictions: No


Weight Bearing: Full Weight


Additional Instructions or Follow Up instructions: 


You presented to the hospital with alcohol withdrawal and were treated for a 

number of days in our ICU. You were very weak from your stay here and initially 

appeared as though you would need to go to a rehab for physical therapy but 

your strength has improved to the point where you can go home. The social 

worker has set up for you to go to [facility] your intake is 

today at 1pm. We are discharging you home with some vitamins that you were 

lacking due to your long term alcohol abuse. 


No Smoking: If you smoke, Please STOP!  Call [phone] for help.

## 2020-11-20 ENCOUNTER — HOSPITAL ENCOUNTER (EMERGENCY)
Dept: HOSPITAL 76 - ED | Age: 60
LOS: 1 days | Discharge: HOME | End: 2020-11-21
Payer: COMMERCIAL

## 2020-11-20 VITALS — SYSTOLIC BLOOD PRESSURE: 144 MMHG | DIASTOLIC BLOOD PRESSURE: 76 MMHG

## 2020-11-20 DIAGNOSIS — N32.89: ICD-10-CM

## 2020-11-20 DIAGNOSIS — R33.9: Primary | ICD-10-CM

## 2020-11-20 DIAGNOSIS — I10: ICD-10-CM

## 2020-11-20 DIAGNOSIS — K80.20: ICD-10-CM

## 2020-11-20 DIAGNOSIS — F17.200: ICD-10-CM

## 2020-11-20 DIAGNOSIS — R30.0: ICD-10-CM

## 2020-11-20 DIAGNOSIS — R10.31: ICD-10-CM

## 2020-11-20 LAB
ALBUMIN DIAFP-MCNC: 4.7 G/DL (ref 3.2–5.5)
ALBUMIN/GLOB SERPL: 1.5 {RATIO} (ref 1–2.2)
ALP SERPL-CCNC: 62 IU/L (ref 42–121)
ALT SERPL W P-5'-P-CCNC: 19 IU/L (ref 10–60)
ANION GAP SERPL CALCULATED.4IONS-SCNC: 14 MMOL/L (ref 6–13)
AST SERPL W P-5'-P-CCNC: 21 IU/L (ref 10–42)
BASOPHILS NFR BLD AUTO: 0.1 10^3/UL (ref 0–0.1)
BASOPHILS NFR BLD AUTO: 0.4 %
BILIRUB BLD-MCNC: 0.7 MG/DL (ref 0.2–1)
BUN SERPL-MCNC: 11 MG/DL (ref 6–20)
CALCIUM UR-MCNC: 9.4 MG/DL (ref 8.5–10.3)
CHLORIDE SERPL-SCNC: 98 MMOL/L (ref 101–111)
CLARITY UR REFRACT.AUTO: CLEAR
CO2 SERPL-SCNC: 23 MMOL/L (ref 21–32)
CREAT SERPLBLD-SCNC: 0.7 MG/DL (ref 0.6–1.2)
EOSINOPHIL # BLD AUTO: 0.1 10^3/UL (ref 0–0.7)
EOSINOPHIL NFR BLD AUTO: 0.7 %
ERYTHROCYTE [DISTWIDTH] IN BLOOD BY AUTOMATED COUNT: 14.6 % (ref 12–15)
GLOBULIN SER-MCNC: 3.2 G/DL (ref 2.1–4.2)
GLUCOSE SERPL-MCNC: 132 MG/DL (ref 70–100)
GLUCOSE UR QL STRIP.AUTO: NEGATIVE MG/DL
HGB UR QL STRIP: 15 G/DL (ref 14–18)
KETONES UR QL STRIP.AUTO: NEGATIVE MG/DL
LIPASE SERPL-CCNC: 33 U/L (ref 22–51)
LYMPHOCYTES # SPEC AUTO: 2.8 10^3/UL (ref 1.5–3.5)
LYMPHOCYTES NFR BLD AUTO: 20.6 %
MCH RBC QN AUTO: 33.3 PG (ref 27–31)
MCHC RBC AUTO-ENTMCNC: 34.8 G/DL (ref 32–36)
MCV RBC AUTO: 95.6 FL (ref 80–94)
MONOCYTES # BLD AUTO: 1.2 10^3/UL (ref 0–1)
MONOCYTES NFR BLD AUTO: 8.4 %
NEUTROPHILS # BLD AUTO: 9.5 10^3/UL (ref 1.5–6.6)
NEUTROPHILS # SNV AUTO: 13.6 X10^3/UL (ref 4.8–10.8)
NEUTROPHILS NFR BLD AUTO: 69.6 %
NITRITE UR QL STRIP.AUTO: NEGATIVE
PDW BLD AUTO: 9.9 FL (ref 7.4–11.4)
PH UR STRIP.AUTO: 6.5 PH (ref 5–7.5)
PLATELET # BLD: 253 10^3/UL (ref 130–450)
PROT SPEC-MCNC: 7.9 G/DL (ref 6.7–8.2)
PROT UR STRIP.AUTO-MCNC: NEGATIVE MG/DL
RBC # UR STRIP.AUTO: NEGATIVE /UL
RBC MAR: 4.51 10^6/UL (ref 4.7–6.1)
SODIUM SERPLBLD-SCNC: 135 MMOL/L (ref 135–145)
SP GR UR STRIP.AUTO: 1.01 (ref 1–1.03)
UROBILINOGEN UR QL STRIP.AUTO: (no result) E.U./DL
UROBILINOGEN UR STRIP.AUTO-MCNC: NEGATIVE MG/DL

## 2020-11-20 PROCEDURE — 51702 INSERT TEMP BLADDER CATH: CPT

## 2020-11-20 PROCEDURE — 96360 HYDRATION IV INFUSION INIT: CPT

## 2020-11-20 PROCEDURE — 80053 COMPREHEN METABOLIC PANEL: CPT

## 2020-11-20 PROCEDURE — 81001 URINALYSIS AUTO W/SCOPE: CPT

## 2020-11-20 PROCEDURE — 99284 EMERGENCY DEPT VISIT MOD MDM: CPT

## 2020-11-20 PROCEDURE — 81003 URINALYSIS AUTO W/O SCOPE: CPT

## 2020-11-20 PROCEDURE — 83690 ASSAY OF LIPASE: CPT

## 2020-11-20 PROCEDURE — 74177 CT ABD & PELVIS W/CONTRAST: CPT

## 2020-11-20 PROCEDURE — 85025 COMPLETE CBC W/AUTO DIFF WBC: CPT

## 2020-11-20 PROCEDURE — 87086 URINE CULTURE/COLONY COUNT: CPT

## 2020-11-20 PROCEDURE — 36415 COLL VENOUS BLD VENIPUNCTURE: CPT

## 2020-11-21 NOTE — ED PHYSICIAN DOCUMENTATION
PD HPI ABD PAIN





- Stated complaint


Stated Complaint: ABD PX





- Chief complaint


Chief Complaint: Abd Pain





- History obtained from


History obtained from: Patient





- Additional information


Additional information: 





60-year-old man presents with right lower quadrant abdominal pain, intermittent 

for 2 weeks associated with dysuria.  aching, moderate severity, gradual onset, 

worse with pressing on the abdomen. Denies hematuria, nausea vomiting or 

diarrhea or back pain, fever or chills.  He states he does have a history of 

prostate enlargement and is worried he has a urinary tract infection.





Review of Systems


Ten Systems: 10 systems reviewed and negative


Constitutional: denies: Fever, Chills


GI: reports: Abdominal Pain.  denies: Nausea


: reports: Dysuria.  denies: Frequency, Hematuria





PD PAST MEDICAL HISTORY





- Past Medical History


Past Medical History: Yes


Cardiovascular: Hypertension


Respiratory: None


Endocrine/Autoimmune: None


GI: None


: None


HEENT: Chronic vision loss, Chronic hearing loss


Psych: None


Musculoskeletal: None


Derm: None


Other Past Medical History: Alcoholism





- Past Surgical History


Past Surgical History: Yes


Ortho: Other





- Present Medications


Home Medications: 


                                Ambulatory Orders











 Medication  Instructions  Recorded  Confirmed


 


Prenatal Vitamin [Trinatal Rx 1] 1 tab PO DAILYWM #30 tablet 05/19/17 


 


Thiamine [Vitamin B-1] 100 mg PO DAILY #30 tablet 05/19/17 














- Allergies


Allergies/Adverse Reactions: 


                                    Allergies











Allergy/AdvReac Type Severity Reaction Status Date / Time


 


cat dander Allergy  Itching Verified 11/20/20 22:15


 


Penicillins Allergy  Rash Verified 11/20/20 22:15














- Social History


Does the pt smoke?: Yes


Smoking Status: Current every day smoker


Does the pt drink ETOH?: Yes


Does the pt have substance abuse?: No





- Immunizations


Immunizations are current?: Yes





- POLST


Patient has POLST: No





PD ED PE NORMAL





- Vitals


Vital signs reviewed: Yes





- General


General: Alert and oriented X 3





- HEENT


HEENT: Atraumatic, PERRL, EOMI





- Neck


Neck: Supple, no meningeal sign





- Cardiac


Cardiac: RRR





- Respiratory


Respiratory: No respiratory distress, Clear bilaterally





- Abdomen


Abdomen: Other (suprapubic tender to palpation. otherwise ntnd)





- Male 


Male : Chaperone present (Male RN tran. normal external male genitalia. BL 

normal testicular lie)





- Rectal


Rectal: Deferred





- Back


Back: No CVA TTP





- Derm


Derm: Normal color





- Extremities


Extremities: No deformity





- Neuro


Neuro: Alert and oriented X 3





- Psych


Psych: Normal mood, Normal affect





Results





- Vitals


Vitals: 





                               Vital Signs - 24 hr











  11/20/20 11/20/20





  22:11 22:58


 


Temperature 36.2 C L 37.0 C


 


Heart Rate 90 


 


Respiratory 18 





Rate  


 


Blood Pressure 144/76 H 


 


O2 Saturation 98 








                                     Oxygen











O2 Source                      Room air

















- Labs


Labs: 





                                Laboratory Tests











  11/20/20 11/20/20 11/20/20





  22:30 22:30 22:30


 


WBC  13.6 H  


 


RBC  4.51 L  


 


Hgb  15.0  


 


Hct  43.1  


 


MCV  95.6 H  


 


MCH  33.3 H  


 


MCHC  34.8  


 


RDW  14.6  


 


Plt Count  253  


 


MPV  9.9  


 


Neut # (Auto)  9.5 H  


 


Lymph # (Auto)  2.8  


 


Mono # (Auto)  1.2 H  


 


Eos # (Auto)  0.1  


 


Baso # (Auto)  0.1  


 


Absolute Nucleated RBC  0.00  


 


Nucleated RBC %  0.0  


 


Sodium   135 


 


Potassium   3.6 


 


Chloride   98 L 


 


Carbon Dioxide   23 


 


Anion Gap   14.0 H 


 


BUN   11 


 


Creatinine   0.7 


 


Estimated GFR (MDRD)   115 


 


Glucose   132 H 


 


Calcium   9.4 


 


Total Bilirubin   0.7 


 


AST   21 


 


ALT   19 


 


Alkaline Phosphatase   62 


 


Total Protein   7.9 


 


Albumin   4.7 


 


Globulin   3.2 


 


Albumin/Globulin Ratio   1.5 


 


Lipase   33 


 


Urine Color    YELLOW


 


Urine Clarity    CLEAR


 


Urine pH    6.5


 


Ur Specific Gravity    1.015


 


Urine Protein    NEGATIVE


 


Urine Glucose (UA)    NEGATIVE


 


Urine Ketones    NEGATIVE


 


Urine Occult Blood    NEGATIVE


 


Urine Nitrite    NEGATIVE


 


Urine Bilirubin    NEGATIVE


 


Urine Urobilinogen    0.2 (NORMAL)


 


Ur Leukocyte Esterase    NEGATIVE


 


Ur Microscopic Review    NOT INDICATED


 


Urine Culture Comments    NOT INDICATED














PD MEDICAL DECISION MAKING





- ED course


Complexity details: reviewed results, re-evaluated patient, d/w patient


ED course: 





60-year-old man presented with dysuria and suprapubic pain, found to be in 

urinary retention on CT.  Addison placed via coud catheter with successful return

of over 1000 cc of urine with immediate relief of symptoms.  Patient will follow

up with outpatient urology.Strict return precautions given





Departure





- Departure


Disposition: 01 Home, Self Care


Discharge Date/Time: 11/21/20 01:32

## 2020-11-21 NOTE — CT REPORT
PROCEDURE:  Abdomen/Pelvis W

 

INDICATIONS:  RLQ pain

 

CONTRAST:  IV CONTRAST: Optiray 320 ml: 100 PO CONTRAST: *NO PO CONTRAST 

 

TECHNIQUE:  

After the administration of     contrast, 5 mm thick sections acquired from the diaphragms to the sym
physis.  5 mm thick coronal and sagittal reformats were acquired.  For radiation dose reduction, the 
following was used:  automated exposure control, adjustment of mA and/or kV according to patient size
.  

 

COMPARISON:  None.

 

FINDINGS:  

Image quality:  Excellent.  

 

ABDOMEN:  

Lung bases:  Lung bases are clear.  Heart size is normal.  

 

Solid organs:  Liver and spleen are normal in size and enhancement. The gallbladder has multiple ston
es. Biliary system is non dilated.  Pancreas enhances normally.  No adrenal nodules.  Kidneys demonst
rate normal size and enhancement, without hydronephrosis.  

 

Peritoneum and bowel:  Bowel loops demonstrate normal wall thickness and caliber.  No free fluid or a
ir.  

 

Nodes and vessels:  No retroperitoneal or mesenteric adenopathy by size criteria. The aorta has diffu
se atherosclerotic disease without aneurysmal dilatation with likely severe stenosis in the left comm
on iliac artery..

 

Miscellaneous:  No ventral hernias.  

 

 

PELVIS:  

Genitourinary: The bladder is severely distended.

 

Miscellaneous:  No inguinal hernias or adenopathy.  

 

Bones:  No suspicious bony lesions.  No vertebral body compression fractures.  

 

IMPRESSION:  

1. Severely distended bladder.

2. Cholelithiasis without evidence of cholecystitis.

3. There is a 3.1 x 2.6 cm hyperdensity in the right tensor of the fascia gunner. This hyperdensity is 
either calcifications within the muscle or an enhancing mass. CT of the pelvis without contrast is re
quired to differentiate.

4. The aorta has atherosclerotic calcifications which are severe at the iliac bifurcation. Severe roxane
nosis in the left common iliac artery is likely.

 

Findings of the possible mass in the right fascial gunner was discussed with Dr. Herron.

 

Reviewed by: Aden Lindsay on 11/21/2020 1:30 AM PST

Approved by: Aden Lindsay on 11/21/2020 1:30 AM PST

 

 

Station ID:  [id number]

## 2022-06-27 ENCOUNTER — HOSPITAL ENCOUNTER (EMERGENCY)
Dept: HOSPITAL 76 - ED | Age: 62
Discharge: HOME | End: 2022-06-27
Payer: COMMERCIAL

## 2022-06-27 VITALS — DIASTOLIC BLOOD PRESSURE: 70 MMHG | SYSTOLIC BLOOD PRESSURE: 148 MMHG

## 2022-06-27 DIAGNOSIS — R31.0: Primary | ICD-10-CM

## 2022-06-27 DIAGNOSIS — F17.200: ICD-10-CM

## 2022-06-27 PROCEDURE — 99282 EMERGENCY DEPT VISIT SF MDM: CPT

## 2022-06-27 PROCEDURE — 99281 EMR DPT VST MAYX REQ PHY/QHP: CPT

## 2022-06-27 NOTE — ED PHYSICIAN DOCUMENTATION
History of Present Illness





- Stated complaint


Stated Complaint: MALE 





- Chief complaint


Chief Complaint: General





- History obtained from


History obtained from: Patient





- Additonal information


Additional information: 


Patient is a 61-year-old male with a history of an enlarged prostate presenting 

for evaluation of blood in urine noted in Addison catheter.  Patient went to his 

urologist in [place] today and had a Addison placed this afternoon around 

130.  He reports it was due to having difficulty urinating for the last several 

weeks.He states he is scheduled to see his urologist again in early August.  

This evening he noticed that the urine appeared red which concerned him.  He 

does not take a blood thinner.  He denies any abdominal discomfort.  He has not 

noted any clots in the bag.  He denies fever, chest pain or difficulty 

breathing.








Review of Systems


Constitutional: denies: Fever


Nose: denies: Congestion


Cardiac: denies: Chest pain / pressure


Respiratory: denies: Dyspnea, Cough


GI: denies: Abdominal Pain, Vomiting


: reports: Unable to Void, Hematuria.  denies: Dysuria


Skin: denies: Rash


Musculoskeletal: denies: Back pain


Neurologic: denies: Headache





PD PAST MEDICAL HISTORY





- Past Medical History


Past Medical History: Yes


Cardiovascular: Hypertension


Respiratory: None


Endocrine/Autoimmune: None


GI: None


: None


HEENT: Chronic vision loss, Chronic hearing loss


Psych: None


Musculoskeletal: None


Derm: None





- Past Surgical History


Past Surgical History: Yes


Ortho: Other





- Present Medications


Home Medications: 


                                Ambulatory Orders











 Medication  Instructions  Recorded  Confirmed


 


Prenatal Vitamin [Trinatal Rx 1] 1 tab PO DAILYWM #30 tablet 05/19/17 


 


Thiamine [Vitamin B-1] 100 mg PO DAILY #30 tablet 05/19/17 














- Allergies


Allergies/Adverse Reactions: 


                                    Allergies











Allergy/AdvReac Type Severity Reaction Status Date / Time


 


cat dander Allergy  Itching Verified 06/27/22 21:39


 


Penicillins Allergy  Rash Verified 06/27/22 21:39














- Social History


Does the pt smoke?: Yes


Smoking Status: Current every day smoker


Does the pt drink ETOH?: Yes


Does the pt have substance abuse?: No





- Immunizations


Immunizations are current?: Yes





- POLST


Patient has POLST: No





PD ED PE NORMAL





- General


General: Alert and oriented X 3, No acute distress, Well developed/nourished





- HEENT


HEENT: Atraumatic





- Neck


Neck: Supple, no meningeal sign





- Cardiac


Cardiac: RRR, No murmur, Strong equal pulses





- Respiratory


Respiratory: No respiratory distress, Clear bilaterally





- Abdomen


Abdomen: Normal bowel sounds, Soft, Non tender, Other (Addison catheter in place, 

red Pablo-Aid appearing urine in Addison bag, Faint pink-tinged urine in Addison 

tubing)





- Derm


Derm: Warm and dry





- Extremities


Extremities: No edema





- Neuro


Neuro: Normal speech





- Psych


Psych: Normal mood





Results





- Vitals


Vitals: 


                               Vital Signs - 24 hr











  06/27/22 06/27/22





  21:36 23:27


 


Temperature 36.2 C L 36.4 C L


 


Heart Rate 95 89


 


Respiratory 18 18





Rate  


 


Blood Pressure 157/82 H 148/70 H


 


O2 Saturation 95 96








                                     Oxygen











O2 Source                      Room air

















PD MEDICAL DECISION MAKING





- ED course


Complexity details: re-evaluated patient


ED course: 


Patient presenting for evaluation of hematuria in the setting of recent Addison 

placement. No pain or tenderness.  Vital signs are stable.  Urine in catheter 

bag is Red Pablo-Aid in color but after emptying it, new urine appears to be more

pink-tinged.  Appears that the bleeding Is resolving.  There are no clots.  

Patient fully continues to be draining.  At this time we will leave in place and

have patient continue to follow-up with urology.  He is aware of strict return 

precautions for Any change in appearance to the urine in his leg bag or other 

symptoms.  He does not have any symptoms to suggest infection at this time.








Departure





- Departure


Disposition: 01 Home, Self Care


Clinical Impression: 


Hematuria


Qualifiers:


 Hematuria type: unspecified type Qualified Code(s): R31.9 - Hematuria, uns

pecified





Condition: Stable


Instructions:  ED Catheter Care Addison, ED Hematuria


Comments: 


You were evaluated for blood in your Addison catheter.  Fortunately it appears 

that the blood is resolving on its own without any specific treatment.  The 

blood may be present from the trauma of having the Addison inserted earlier today.

 Please continue to stay hydrated with plenty of fluid.  Please follow-up with 

your urologist as scheduled.  Please return to your urologist immediately or to 

the emergency department if you have worsening Bleeding, abdominal pain, clots 

in the Addison bag, fever or have any other concerns.


Discharge Date/Time: 06/27/22 23:27

## 2022-07-12 ENCOUNTER — HOSPITAL ENCOUNTER (OUTPATIENT)
Dept: HOSPITAL 76 - DI | Age: 62
Discharge: HOME | End: 2022-07-12
Attending: UROLOGY
Payer: COMMERCIAL

## 2022-07-12 DIAGNOSIS — R33.9: Primary | ICD-10-CM
